# Patient Record
Sex: FEMALE | Race: WHITE | NOT HISPANIC OR LATINO | Employment: FULL TIME | URBAN - METROPOLITAN AREA
[De-identification: names, ages, dates, MRNs, and addresses within clinical notes are randomized per-mention and may not be internally consistent; named-entity substitution may affect disease eponyms.]

---

## 2023-02-04 ENCOUNTER — OFFICE VISIT (OUTPATIENT)
Dept: URGENT CARE | Facility: CLINIC | Age: 28
End: 2023-02-04
Payer: COMMERCIAL

## 2023-02-04 VITALS
HEIGHT: 65 IN | SYSTOLIC BLOOD PRESSURE: 125 MMHG | DIASTOLIC BLOOD PRESSURE: 85 MMHG | OXYGEN SATURATION: 98 % | HEART RATE: 88 BPM | RESPIRATION RATE: 18 BRPM | TEMPERATURE: 99 F | WEIGHT: 165 LBS | BODY MASS INDEX: 27.49 KG/M2

## 2023-02-04 DIAGNOSIS — M25.531 PAIN AND SWELLING OF RIGHT WRIST: ICD-10-CM

## 2023-02-04 DIAGNOSIS — M25.431 PAIN AND SWELLING OF RIGHT WRIST: ICD-10-CM

## 2023-02-04 DIAGNOSIS — S52.591A OTHER CLOSED FRACTURE OF DISTAL END OF RIGHT RADIUS, INITIAL ENCOUNTER: Primary | ICD-10-CM

## 2023-02-04 PROCEDURE — 3074F SYST BP LT 130 MM HG: CPT | Mod: CPTII,,, | Performed by: FAMILY MEDICINE

## 2023-02-04 PROCEDURE — 1159F PR MEDICATION LIST DOCUMENTED IN MEDICAL RECORD: ICD-10-PCS | Mod: CPTII,,, | Performed by: FAMILY MEDICINE

## 2023-02-04 PROCEDURE — 1160F RVW MEDS BY RX/DR IN RCRD: CPT | Mod: CPTII,,, | Performed by: FAMILY MEDICINE

## 2023-02-04 PROCEDURE — 3079F DIAST BP 80-89 MM HG: CPT | Mod: CPTII,,, | Performed by: FAMILY MEDICINE

## 2023-02-04 PROCEDURE — 99203 PR OFFICE/OUTPT VISIT, NEW, LEVL III, 30-44 MIN: ICD-10-PCS | Mod: ,,, | Performed by: FAMILY MEDICINE

## 2023-02-04 PROCEDURE — 3008F BODY MASS INDEX DOCD: CPT | Mod: CPTII,,, | Performed by: FAMILY MEDICINE

## 2023-02-04 PROCEDURE — 3079F PR MOST RECENT DIASTOLIC BLOOD PRESSURE 80-89 MM HG: ICD-10-PCS | Mod: CPTII,,, | Performed by: FAMILY MEDICINE

## 2023-02-04 PROCEDURE — 3074F PR MOST RECENT SYSTOLIC BLOOD PRESSURE < 130 MM HG: ICD-10-PCS | Mod: CPTII,,, | Performed by: FAMILY MEDICINE

## 2023-02-04 PROCEDURE — 3008F PR BODY MASS INDEX (BMI) DOCUMENTED: ICD-10-PCS | Mod: CPTII,,, | Performed by: FAMILY MEDICINE

## 2023-02-04 PROCEDURE — 99203 OFFICE O/P NEW LOW 30 MIN: CPT | Mod: ,,, | Performed by: FAMILY MEDICINE

## 2023-02-04 PROCEDURE — 1159F MED LIST DOCD IN RCRD: CPT | Mod: CPTII,,, | Performed by: FAMILY MEDICINE

## 2023-02-04 PROCEDURE — 1160F PR REVIEW ALL MEDS BY PRESCRIBER/CLIN PHARMACIST DOCUMENTED: ICD-10-PCS | Mod: CPTII,,, | Performed by: FAMILY MEDICINE

## 2023-02-04 RX ORDER — NORETHINDRONE ACETATE AND ETHINYL ESTRADIOL, AND FERROUS FUMARATE 1MG-20(24)
1 KIT ORAL DAILY
COMMUNITY
Start: 2023-01-16

## 2023-02-04 RX ORDER — DEXTROAMPHETAMINE SACCHARATE, AMPHETAMINE ASPARTATE, DEXTROAMPHETAMINE SULFATE AND AMPHETAMINE SULFATE 7.5; 7.5; 7.5; 7.5 MG/1; MG/1; MG/1; MG/1
TABLET ORAL
COMMUNITY

## 2023-02-04 NOTE — LETTER
"    [facility] FAX TRANSMISSION  Fax Cover Sheet        DATE: 02/04/2023    TO: [facility]    FAX: [phone]    ATTN: Scheduling    FROM: [facility]                Phone: [phone]                Fax: [phone]    BY:  Anil    COMMENTS:  **Urgent Referral- Concern for Buckle Fx Wrist**                        CONFIDENTIALITY NOTICE This facsimile transmission and any documents accompanying are intended only for the use of the individual or entity names above on this transmission sheet and may contain information from [facility], which is confidential, privileged, and exempt from disclosure under applicable law. If you are not the intended recipient, you are notified that any disclosure, copying, distributing, or use of the contents of this facsimile is strictly prohibited. If you have received this transmission in error, please notify us.               Office Visit  02/04/2023  Summary of Care      Alysha Capps "Alysha" - 28 y.o. Female; born Jan. 26, 1995  Summary of Care, generated on Feb. 04, 2023  Reason for Referral    Consultation (Urgent) - Authorized  Reason for Referral - Consultation (Urgent) - Authorized  Specialty Diagnoses / Procedures Referred By Contact Referred To Contact   Orthopedic Surgery / Orthopedics Diagnoses    Other closed fracture of distal end of right radius, initial encounter     Debora Nesbitt MD    [address] [place] [address]    Phone: [phone]    Fax: [phone]   Michael Gray MD    [facility]    [address]    [place] [address]    Phone: [phone]    Fax: [phone]       Reason for Referral - Consultation (Urgent) - Authorized  Referral ID Status Reason Start Date Expiration Date Visits Requested Visits Authorized   [id number] Authorized   2/4/2023 2/4/2024 1 1      Electronically signed by Debora Nesbitt MD at 02/04/2023 10:27 AM CST  "   Reason for Visit    Reason for Visit -   Reason Comments   Wrist Pain Patient was roller skating and fell yesterday on her right wrist      Encounter Details    Encounter Details  Date Type Department Care Team Description   02/04/2023 Office Visit [facility]    [address]    [place] [id number] [phone]   Debora Nesbitt MD    [address]    [place] [address]    [phone] (Work)    [phone] (Fax)   Other closed fracture of distal end of right radius, initial encounter (Primary Dx);   Pain and swelling of right wrist     Allergies  - documented as of this encounter (statuses as of 02/04/2023)  No known active allergies  Medications  - documented as of this encounter (statuses as of 02/04/2023)  Medications  Medication Sig Dispensed Refills Start Date End Date Status   dextroamphetamine-amphetamine 30 mg Tab   Adderall Take No date recorded No form recorded No frequency recorded No route recorded No set duration recorded No set duration amount recorded active No dosage strength recorded No dosage strength units of measure recorded   0     Active   CHAPARRITA 24 FE 1 mg-20 mcg (24)/75 mg (4) per tablet   Take 1 tablet by mouth.   0 01/16/2023   Active     Active Problems  - documented as of this encounter (statuses as of 02/04/2023)  No known active problems    Social History  - documented as of this encounter  Social History  Tobacco Use Types Packs/Day Years Used Date   Smoking Tobacco: Never           Smokeless Tobacco: Never             Social History  Tobacco Cessation: Counseling Given: Not Answered     Social History  Alcohol Use Standard Drinks/Week Comments   Yes 0 (1 standard drink = 0.6 oz pure alcohol)       Social History  Sex Assigned at Birth Date Recorded   Not on file       Last Filed Vital Signs  - documented in this encounter  Last Filed Vital Signs  Vital Sign Reading Time Taken Comments   Blood Pressure 125/85 02/04/2023 10:02  "AM CST     Pulse 88 02/04/2023 10:02 AM CST     Temperature 37.1 °C (98.7 °F) 02/04/2023 10:02 AM CST     Respiratory Rate 18 02/04/2023 10:02 AM CST     Oxygen Saturation 98% 02/04/2023 10:02 AM CST     Inhaled Oxygen Concentration - -     Weight 74.8 kg (165 lb) 02/04/2023 10:02 AM CST     Height 165.1 cm (5' 5") 02/04/2023 10:02 AM CST     Body Mass Index 27.46 02/04/2023 10:02 AM CST       Patient Instructions  - documented in this encounter  Patient Instructions  Debora Nesbitt MD - 02/04/2023 9:55 AM CST    Formatting of this note might be different from the original.  With history of fall, right wrist pain and swelling x-rays today.  Reviewed the x-rays, concerns of buckle fracture distal radial head, nondisplaced. Radiology final results will be monitored and reported  Rest, ice, splint for now, elevation. Ibuprofen for pain every 8 hours.  ER precautions with any acute change in symptoms like tingling numbness and weakness  . Orthopedic follow-up. Referral in the chart  Electronically signed by Debora Nesbitt MD at 02/04/2023 10:27 AM CST     Progress Notes  - documented in this encounter  Debora Nesbitt MD - 02/04/2023 9:55 AM CST  Formatting of this note is different from the original.  Subjective:     Patient ID: Alysha Capps is a 28 y.o. female.    Vitals: height is 5' 5" (1.651 m) and weight is 74.8 kg (165 lb). Her temperature is 98.7 °F (37.1 °C). Her blood pressure is 125/85 and her pulse is 88. Her respiration is 18 and oxygen saturation is 98%.     Chief Complaint: Wrist Pain (Patient was roller skating and fell yesterday on her right wrist )    HPI: 28-year-old female present to clinic with concerns of right wrist pain. History of fall yesterday was skateboarding. Denies tingling numbness or weakness to hands or fingers. No head injury, no loss of consciousness. States was wearing the wrist brace.    ROS :  Constitutional : No fever, no fatigue  Neck : Negative except HPI  Respiratory : No " shortness of breath, no wheezing  Cardiovascular : No chest pain  Musculoskeletal : Negative except HPI  Integumentary : No rash, no abnormal lesion  Neurological : Negative for tingling numbness and weakness   Objective:     Physical Exam  General : Alert and oriented, No apparent distress, Afebrile  Neck -: supple, Non Tender  Respiratory :Lungs are clear to auscultate, Breath sounds are equal  Cardiovascular : Normal rate, normal volume pulse bilateral  Musculoskeletal :_ right wrist dorsum radially appears swollen and bruised, tender to palpate. Fingers range of motion limited due to pain  Integumentary : Warm, Dry   Neurologic : Alert and Oriented X 4, sensations intact, motor intact   Assessment:     1. Other closed fracture of distal end of right radius, initial encounter   2. Pain and swelling of right wrist       Plan:   With history of fall, right wrist pain and swelling x-rays today.  Reviewed the x-rays, concerns of buckle fracture distal radial head, nondisplaced. Radiology final results will be monitored and reported  Rest, ice, splint for now, elevation. Ibuprofen for pain every 8 hours.  ER precautions with any acute change in symptoms like tingling numbness and weakness  . Orthopedic follow-up. Referral in the chart    Other closed fracture of distal end of right radius, initial encounter  - Ambulatory referral/consult to Orthopedics    Pain and swelling of right wrist  - XR WRIST COMPLETE 3 VIEWS RIGHT; Future; Expected date: 02/04/2023  Electronically signed by Debora Nesbitt MD at 02/04/2023 10:27 AM CST   Miscellaneous Notes  - documented in this encounter  Addendum Note - RT Israel - 02/04/2023 9:55 AM CST  Addended by: SHAYE GOLDEN on: 2/4/2023 10:31 AM    Modules accepted: Orders      Electronically signed by RT Israel at 02/04/2023 10:31 AM CST   Plan of Treatment  - documented as of this encounter  Plan of Treatment - Scheduled Referrals  Scheduled Referrals  Name Type  Priority Associated Diagnoses Order Schedule   Ambulatory referral/consult to Orthopedics Outpatient Referral Urgent Other closed fracture of distal end of right radius, initial encounter   Ordered: 02/04/2023     Plan of Treatment  Health Maintenance Due Date Last Done Comments   TETANUS VACCINE 01/26/2014       Pap Smear 01/26/2017       COVID-19 Vaccine (2 - Pfizer series) 09/09/2021 08/19/2021     HIV Screening Completed 07/12/2021, 07/12/2021     Hepatitis C Screening Completed 07/12/2021     Lipid Panel Completed 08/15/2022     Influenza Vaccine Completed 11/03/2022, 10/18/2021, 10/15/2017, Additional history exists     Pneumococcal Vaccines (Age 0-64) Aged Out   No longer eligible based on patient's age to complete this topic     Results  - documented in this encounter  XR WRIST COMPLETE 3 VIEWS RIGHT (02/04/2023 10:17 AM CST)  Results - XR WRIST COMPLETE 3 VIEWS RIGHT (02/04/2023 10:17 AM CST)  Anatomical Region Laterality Modality   Wrist Right Digital Radiography     Results - XR WRIST COMPLETE 3 VIEWS RIGHT (02/04/2023 10:17 AM CST)  Specimen (Source) Anatomical Location / Laterality Collection Method / Volume Collection Time Received Time                 Results - XR WRIST COMPLETE 3 VIEWS RIGHT (02/04/2023 10:17 AM CST)  Impressions   02/04/2023 10:28 AM CST      No acute findings.      Electronically signed by: Isaias Crum  Date: 02/04/2023  Time: 10:28       Results - XR WRIST COMPLETE 3 VIEWS RIGHT (02/04/2023 10:17 AM CST)  Narrative   02/04/2023 10:28 AM CST    EXAMINATION:  XR WRIST COMPLETE 3 VIEWS RIGHT    CLINICAL HISTORY:  Fell yesterday while skateboarding;  Pain in right wrist    COMPARISON:  None    FINDINGS:  Three views of the right wrist demonstrate no fracture or dislocation.         Results - XR WRIST COMPLETE 3 VIEWS RIGHT (02/04/2023 10:17 AM CST)  Procedure Note   Isaias Crum MD - 02/04/2023    Formatting of this note might be different from the original.  EXAMINATION:  XR  WRIST COMPLETE 3 VIEWS RIGHT    CLINICAL HISTORY:  Fell yesterday while skateboarding; Pain in right wrist    COMPARISON:  None    FINDINGS:  Three views of the right wrist demonstrate no fracture or dislocation.    Impression:    No acute findings.      Electronically signed by:Isaias Crum  Date:02/04/2023  Time:10:28     Results - XR WRIST COMPLETE 3 VIEWS RIGHT (02/04/2023 10:17 AM CST)  Authorizing Provider Result Type   Debora Nesbitt MD IMG DIAGNOSTIC IMAGING ORDERABLES     Visit Diagnoses  - documented in this encounter  Visit Diagnoses  Diagnosis   Other closed fracture of distal end of right radius, initial encounter - Primary     Pain and swelling of right wrist     Pain and swelling of right wrist       Insurance  - documented as of this encounter  Insurance  Payer Benefit Plan / Group Subscriber ID Effective Dates Phone Address Type   [employer]EFIT [facility] [employer] [place] [id number] 1/1/2023-Present   [address] [address]    [place] [phone]   Self Funded     Insurance  Guarantor Name Account Type Relation to Patient Date of Birth Phone Billing Address   Alysha Capps Personal/Family Self 1995 [phone] (Home)   [address] [place] [id number]       Images  Patient Demographics    Patient Demographics  Patient Address Communication Language Race / Ethnicity Marital Status   [address] [place] [address] [phone] (Home)  [phone] (Mobile)  [email or web address] English (Preferred) White / Not  or  Single     Patient Contacts    Patient Contacts  Contact Name Contact Address Communication Relationship to Patient   Billy Ndiaye Unknown [phone] (Mobile) Significant other, Emergency Contact     Document Information    Primary Care Provider Other Service Providers Document Coverage Dates   Renny Mccartney Jr. (Feb. 04, 2023February 04, 2023 - Present)  [phone] (Work)  [phone] (Fax)  [address] [place]  [address]  Internal Medicine  [facility]  [address] [address]  [place] [address]  Feb. 04, 2023February 04, 2023     Encounter Providers Encounter Date   Debora Nesbitt MD (Attending)  [phone] (Work)  [phone] (Fax)  [address] [place] [address]  Family Medicine Feb. 04, 2023February 04, 2023

## 2023-02-04 NOTE — PATIENT INSTRUCTIONS
With history of fall, right wrist pain and swelling x-rays today.  Reviewed the x-rays, concerns of buckle fracture distal radial head, nondisplaced.  Radiology final results will be monitored and reported  Rest, ice, splint for now, elevation.  Ibuprofen for pain every 8 hours.  ER precautions with any acute change in symptoms like tingling numbness and weakness  .  Orthopedic follow-up.  Referral in the chart

## 2023-02-04 NOTE — PROGRESS NOTES
"Subjective:       Patient ID: Alysha Capps is a 28 y.o. female.    Vitals:  height is 5' 5" (1.651 m) and weight is 74.8 kg (165 lb). Her temperature is 98.7 °F (37.1 °C). Her blood pressure is 125/85 and her pulse is 88. Her respiration is 18 and oxygen saturation is 98%.     Chief Complaint: Wrist Pain (Patient was roller skating and fell yesterday on her right wrist )    HPI:  28-year-old female present to clinic with concerns of right wrist pain.  History of fall yesterday was skateboarding.  Denies tingling numbness or weakness to hands or fingers.  No head injury, no loss of consciousness.  States was wearing the wrist brace.    ROS  :  Constitutional : No fever, no fatigue  Neck : Negative except HPI  Respiratory : No shortness of breath, no wheezing  Cardiovascular : No chest pain  Musculoskeletal : Negative except HPI  Integumentary : No rash, no abnormal lesion  Neurological : Negative for tingling numbness and weakness   Objective:      Physical Exam    General : Alert and oriented, No apparent distress, Afebrile  Neck -: supple, Non Tender  Respiratory :Lungs are clear to auscultate, Breath sounds are equal  Cardiovascular : Normal rate, normal volume pulse bilateral  Musculoskeletal :_ right wrist dorsum radially appears swollen and bruised, tender to palpate.  Fingers range of motion limited due to pain  Integumentary : Warm, Dry   Neurologic : Alert and Oriented X 4, sensations intact, motor intact   Assessment:       1. Other closed fracture of distal end of right radius, initial encounter    2. Pain and swelling of right wrist          Plan:     With history of fall, right wrist pain and swelling x-rays today.  Reviewed the x-rays, concerns of buckle fracture distal radial head, nondisplaced.  Radiology final results will be monitored and reported  Rest, ice, splint for now, elevation.  Ibuprofen for pain every 8 hours.  ER precautions with any acute change in symptoms like tingling numbness and " weakness  .  Orthopedic follow-up.  Referral in the chart    Other closed fracture of distal end of right radius, initial encounter  -     Ambulatory referral/consult to Orthopedics    Pain and swelling of right wrist  -     XR WRIST COMPLETE 3 VIEWS RIGHT; Future; Expected date: 02/04/2023

## 2023-02-04 NOTE — LETTER
"    [facility] FAX TRANSMISSION  Fax Cover Sheet        DATE: 02/04/2023    TO: [facility]    FAX: [phone]    ATTN: Scheduling    FROM: [facility]                Phone: [phone]                Fax: [phone]    BY:  Anil    COMMENTS:  ****Urgent Referral- Concern for Buckle Fx Wrist****                        CONFIDENTIALITY NOTICE This facsimile transmission and any documents accompanying are intended only for the use of the individual or entity names above on this transmission sheet and may contain information from [facility], which is confidential, privileged, and exempt from disclosure under applicable law. If you are not the intended recipient, you are notified that any disclosure, copying, distributing, or use of the contents of this facsimile is strictly prohibited. If you have received this transmission in error, please notify us.         Office Visit  02/04/2023  Summary of Care      Alysha Capps "Alysha" - 28 y.o. Female; born [birth year]Summary of Care, generated on Feb. 04, 2023February 04, 2023   Reason for Referral    Consultation (Urgent) - Authorized  Reason for Referral - Consultation (Urgent) - Authorized  Specialty Diagnoses / Procedures Referred By Contact Referred To Contact   Orthopedic Surgery / Orthopedics Diagnoses    Other closed fracture of distal end of right radius, initial encounter     Debora Nesbitt MD    [facility] [place] [address]    Phone: [phone]    Fax: [phone]   Michael Gray MD    [facility] [facility]    [address]    [place] [address]    Phone: [phone]    Fax: [phone]       Reason for Referral - Consultation (Urgent) - Authorized  Referral ID Status Reason Start Date Expiration Date Visits Requested Visits Authorized   [id number] Authorized   2/4/2023 2/4/2024 1 1      Electronically signed by Debora Nesbitt MD at " 02/04/2023 10:27 AM CST    Reason for Visit    Reason for Visit -   Reason Comments   Wrist Pain Patient was roller skating and fell yesterday on her right wrist      Encounter Details    Encounter Details  Date Type Department Care Team Description   02/04/2023 Office Visit [facility]    [address]    [place] [id number] [phone]   Debora Nesbitt MD    [address] [address]    [phone] (Work)    [phone] (Fax)   Other closed fracture of distal end of right radius, initial encounter (Primary Dx);   Pain and swelling of right wrist     Allergies  - documented as of this encounter (statuses as of 02/04/2023)  No known active allergies  Medications  - documented as of this encounter (statuses as of 02/04/2023)  Medications  Medication Sig Dispensed Refills Start Date End Date Status   dextroamphetamine-amphetamine 30 mg Tab   Adderall Take No date recorded No form recorded No frequency recorded No route recorded No set duration recorded No set duration amount recorded active No dosage strength recorded No dosage strength units of measure recorded   0     Active   CHAPARRITA 24 FE 1 mg-20 mcg (24)/75 mg (4) per tablet   Take 1 tablet by mouth.   0 01/16/2023   Active     Active Problems  - documented as of this encounter (statuses as of 02/04/2023)  No known active problems    Social History  - documented as of this encounter  Social History  Tobacco Use Types Packs/Day Years Used Date   Smoking Tobacco: Never           Smokeless Tobacco: Never             Social History  Tobacco Cessation: Counseling Given: Not Answered     Social History  Alcohol Use Standard Drinks/Week Comments   Yes 0 (1 standard drink = 0.6 oz pure alcohol)       Social History  Sex Assigned at Birth Date Recorded   Not on file       Last Filed Vital Signs  - documented in this encounter  Last Filed Vital Signs  Vital Sign Reading Time Taken Comments   Blood  "Pressure 125/85 02/04/2023 10:02 AM CST     Pulse 88 02/04/2023 10:02 AM CST     Temperature 37.1 °C (98.7 °F) 02/04/2023 10:02 AM CST     Respiratory Rate 18 02/04/2023 10:02 AM CST     Oxygen Saturation 98% 02/04/2023 10:02 AM CST     Inhaled Oxygen Concentration - -     Weight 74.8 kg (165 lb) 02/04/2023 10:02 AM CST     Height 165.1 cm (5' 5") 02/04/2023 10:02 AM CST     Body Mass Index 27.46 02/04/2023 10:02 AM CST       Patient Instructions  - documented in this encounter  Patient Instructions  Debora Nesbitt MD - 02/04/2023 9:55 AM CST    Formatting of this note might be different from the original.  With history of fall, right wrist pain and swelling x-rays today.  Reviewed the x-rays, concerns of buckle fracture distal radial head, nondisplaced. Radiology final results will be monitored and reported  Rest, ice, splint for now, elevation. Ibuprofen for pain every 8 hours.  ER precautions with any acute change in symptoms like tingling numbness and weakness  . Orthopedic follow-up. Referral in the chart  Electronically signed by Debora Nesbitt MD at 02/04/2023 10:27 AM CST     Progress Notes  - documented in this encounter  Debora Nesbitt MD - 02/04/2023 9:55 AM CST  Formatting of this note is different from the original.  Subjective:     Patient ID: Alysha Capps is a 28 y.o. female.    Vitals: height is 5' 5" (1.651 m) and weight is 74.8 kg (165 lb). Her temperature is 98.7 °F (37.1 °C). Her blood pressure is 125/85 and her pulse is 88. Her respiration is 18 and oxygen saturation is 98%.     Chief Complaint: Wrist Pain (Patient was roller skating and fell yesterday on her right wrist )    HPI: 28-year-old female present to clinic with concerns of right wrist pain. History of fall yesterday was skateboarding. Denies tingling numbness or weakness to hands or fingers. No head injury, no loss of consciousness. States was wearing the wrist brace.    ROS :  Constitutional : No fever, no fatigue  Neck : Negative " except HPI  Respiratory : No shortness of breath, no wheezing  Cardiovascular : No chest pain  Musculoskeletal : Negative except HPI  Integumentary : No rash, no abnormal lesion  Neurological : Negative for tingling numbness and weakness   Objective:     Physical Exam  General : Alert and oriented, No apparent distress, Afebrile  Neck -: supple, Non Tender  Respiratory :Lungs are clear to auscultate, Breath sounds are equal  Cardiovascular : Normal rate, normal volume pulse bilateral  Musculoskeletal :_ right wrist dorsum radially appears swollen and bruised, tender to palpate. Fingers range of motion limited due to pain  Integumentary : Warm, Dry   Neurologic : Alert and Oriented X 4, sensations intact, motor intact   Assessment:     1. Other closed fracture of distal end of right radius, initial encounter   2. Pain and swelling of right wrist       Plan:   With history of fall, right wrist pain and swelling x-rays today.  Reviewed the x-rays, concerns of buckle fracture distal radial head, nondisplaced. Radiology final results will be monitored and reported  Rest, ice, splint for now, elevation. Ibuprofen for pain every 8 hours.  ER precautions with any acute change in symptoms like tingling numbness and weakness  . Orthopedic follow-up. Referral in the chart    Other closed fracture of distal end of right radius, initial encounter  - Ambulatory referral/consult to Orthopedics    Pain and swelling of right wrist  - XR WRIST COMPLETE 3 VIEWS RIGHT; Future; Expected date: 02/04/2023                Electronically signed by Debora Nesbitt MD at 02/04/2023 10:27 AM CST   Miscellaneous Notes  - documented in this encounter  Addendum Note - RT Israel - 02/04/2023 9:55 AM CST  Addended by: SHAYE GOLDEN on: 2/4/2023 10:31 AM    Modules accepted: Orders      Electronically signed by RT Israel at 02/04/2023 10:31 AM CST   Plan of Treatment  - documented as of this encounter  Plan of Treatment - Scheduled  Referrals  Scheduled Referrals  Name Type Priority Associated Diagnoses Order Schedule   Ambulatory referral/consult to Orthopedics Outpatient Referral Urgent Other closed fracture of distal end of right radius, initial encounter   Ordered: 02/04/2023     Plan of Treatment  Health Maintenance Due Date Last Done Comments   TETANUS VACCINE 01/26/2014       Pap Smear 01/26/2017       COVID-19 Vaccine (2 - Pfizer series) 09/09/2021 08/19/2021     HIV Screening Completed 07/12/2021, 07/12/2021     Hepatitis C Screening Completed 07/12/2021     Lipid Panel Completed 08/15/2022     Influenza Vaccine Completed 11/03/2022, 10/18/2021, 10/15/2017, Additional history exists     Pneumococcal Vaccines (Age 0-64) Aged Out   No longer eligible based on patient's age to complete this topic     Results  - documented in this encounter  XR WRIST COMPLETE 3 VIEWS RIGHT (02/04/2023 10:17 AM CST)  Results - XR WRIST COMPLETE 3 VIEWS RIGHT (02/04/2023 10:17 AM CST)  Anatomical Region Laterality Modality   Wrist Right Digital Radiography     Results - XR WRIST COMPLETE 3 VIEWS RIGHT (02/04/2023 10:17 AM CST)  Specimen (Source) Anatomical Location / Laterality Collection Method / Volume Collection Time Received Time                 Results - XR WRIST COMPLETE 3 VIEWS RIGHT (02/04/2023 10:17 AM CST)  Impressions   02/04/2023 10:28 AM CST      No acute findings.      Electronically signed by: Isaias Crum  Date: 02/04/2023  Time: 10:28       Results - XR WRIST COMPLETE 3 VIEWS RIGHT (02/04/2023 10:17 AM CST)  Narrative   02/04/2023 10:28 AM CST    EXAMINATION:  XR WRIST COMPLETE 3 VIEWS RIGHT    CLINICAL HISTORY:  Fell yesterday while skateboarding;  Pain in right wrist    COMPARISON:  None    FINDINGS:  Three views of the right wrist demonstrate no fracture or dislocation.         Results - XR WRIST COMPLETE 3 VIEWS RIGHT (02/04/2023 10:17 AM CST)  Procedure Note   Isaias Crum MD - 02/04/2023    Formatting of this note might be  different from the original.  EXAMINATION:  XR WRIST COMPLETE 3 VIEWS RIGHT    CLINICAL HISTORY:  Fell yesterday while skateboarding; Pain in right wrist    COMPARISON:  None    FINDINGS:  Three views of the right wrist demonstrate no fracture or dislocation.    Impression:    No acute findings.      Electronically signed by:Isaias Crum  Date:02/04/2023  Time:10:28     Results - XR WRIST COMPLETE 3 VIEWS RIGHT (02/04/2023 10:17 AM CST)  Authorizing Provider Result Type   Debora Nesbitt MD IMG DIAGNOSTIC IMAGING ORDERABLES     Visit Diagnoses  - documented in this encounter  Visit Diagnoses  Diagnosis   Other closed fracture of distal end of right radius, initial encounter - Primary     Pain and swelling of right wrist     Pain and swelling of right wrist       Insurance  - documented as of this encounter  Insurance  Payer Benefit Plan / Group Subscriber ID Effective Dates Phone Address Type   [employer] B[employer]EF[employer] [place] [id number] 1/1/2023-Present   [address] [address]    [place] [address]   Self Funded     Insurance  Guarantor Name Account Type Relation to Patient Date of Birth Phone Billing Address   Alysha Capps Personal/Family Self 1995 [phone] (Home)   [address] [place] [address]       Images  Patient Demographics    Patient Demographics  Patient Address Communication Language Race / Ethnicity Marital Status   [address] [place] [address] [phone] (Home)  [phone] (Mobile)  [email or web address] English (Preferred) White / Not  or  Single     Patient Contacts    Patient Contacts  Contact Name Contact Address Communication Relationship to Patient   Billy Ndiaye Unknown [phone] (Mobile) Significant other, Emergency Contact     Document Information    Primary Care Provider Other Service Providers Document Coverage Dates   Renny Mccartney Jr. (Feb. 04, 2023February 04, 2023 - Present)  [phone]  (Work)  [phone] (Fax)  [address]   [place] [address]  Internal Medicine  [facility]  [address] [address]  [place] [address]  Feb. 04, 2023February 04, 2023     Encounter Providers Encounter Date   Debora Nesbitt MD (Attending)  [phone] (Work)  [phone] (Fax)  [address] [place] [address]  Family Medicine Feb. 04, 2023February 04, 2023

## 2023-02-04 NOTE — LETTER
"    [facility] FAX TRANSMISSION  Fax Cover Sheet        DATE: 02/04/2023    TO: [facility]    FAX: [phone]    ATTN: Scheduling    FROM: [facility]                Phone: [phone]                Fax: [phone]    BY:  Anil    COMMENTS:  ****Urgent Referral- Concern for Buckle Fx Wrist****                        CONFIDENTIALITY NOTICE This facsimile transmission and any documents accompanying are intended only for the use of the individual or entity names above on this transmission sheet and may contain information from [facility], which is confidential, privileged, and exempt from disclosure under applicable law. If you are not the intended recipient, you are notified that any disclosure, copying, distributing, or use of the contents of this facsimile is strictly prohibited. If you have received this transmission in error, please notify us.         Office Visit  02/04/2023  Summary of Care      Alysha Capps "Alysha" - 28 y.o. Female; born [birth year]Summary of Care, generated on Feb. 04, 2023February 04, 2023   Reason for Referral    Consultation (Urgent) - Authorized  Reason for Referral - Consultation (Urgent) - Authorized  Specialty Diagnoses / Procedures Referred By Contact Referred To Contact   Orthopedic Surgery / Orthopedics Diagnoses    Other closed fracture of distal end of right radius, initial encounter     Debora Nesbitt MD    [facility] [place] [address]    Phone: [phone]    Fax: [phone]   Michael Gray MD    [facility]    [address]    [place] [address]    Phone: [phone]    Fax: [phone]       Reason for Referral - Consultation (Urgent) - Authorized  Referral ID Status Reason Start Date Expiration Date Visits Requested Visits Authorized   [id number] Authorized   2/4/2023 2/4/2024 1 1      Electronically signed by Debora Nesbitt MD at " 02/04/2023 10:27 AM CST    Reason for Visit    Reason for Visit -   Reason Comments   Wrist Pain Patient was roller skating and fell yesterday on her right wrist      Encounter Details    Encounter Details  Date Type Department Care Team Description   02/04/2023 Office Visit [facility]    [address]    [place] [id number] [phone]   Debora Nesbitt MD    [address] [phone]    [phone] (Work)    [phone] (Fax)   Other closed fracture of distal end of right radius, initial encounter (Primary Dx);   Pain and swelling of right wrist     Allergies  - documented as of this encounter (statuses as of 02/04/2023)  No known active allergies  Medications  - documented as of this encounter (statuses as of 02/04/2023)  Medications  Medication Sig Dispensed Refills Start Date End Date Status   dextroamphetamine-amphetamine 30 mg Tab   Adderall Take No date recorded No form recorded No frequency recorded No route recorded No set duration recorded No set duration amount recorded active No dosage strength recorded No dosage strength units of measure recorded   0     Active   CHAPARRITA 24 FE 1 mg-20 mcg (24)/75 mg (4) per tablet   Take 1 tablet by mouth.   0 01/16/2023   Active     Active Problems  - documented as of this encounter (statuses as of 02/04/2023)  No known active problems    Social History  - documented as of this encounter  Social History  Tobacco Use Types Packs/Day Years Used Date   Smoking Tobacco: Never           Smokeless Tobacco: Never             Social History  Tobacco Cessation: Counseling Given: Not Answered     Social History  Alcohol Use Standard Drinks/Week Comments   Yes 0 (1 standard drink = 0.6 oz pure alcohol)       Social History  Sex Assigned at Birth Date Recorded   Not on file       Last Filed Vital Signs  - documented in this encounter  Last Filed Vital Signs  Vital Sign Reading Time Taken Comments   Blood  "Pressure 125/85 02/04/2023 10:02 AM CST     Pulse 88 02/04/2023 10:02 AM CST     Temperature 37.1 °C (98.7 °F) 02/04/2023 10:02 AM CST     Respiratory Rate 18 02/04/2023 10:02 AM CST     Oxygen Saturation 98% 02/04/2023 10:02 AM CST     Inhaled Oxygen Concentration - -     Weight 74.8 kg (165 lb) 02/04/2023 10:02 AM CST     Height 165.1 cm (5' 5") 02/04/2023 10:02 AM CST     Body Mass Index 27.46 02/04/2023 10:02 AM CST       Patient Instructions  - documented in this encounter  Patient Instructions  Debora Nesbitt MD - 02/04/2023 9:55 AM CST    Formatting of this note might be different from the original.  With history of fall, right wrist pain and swelling x-rays today.  Reviewed the x-rays, concerns of buckle fracture distal radial head, nondisplaced. Radiology final results will be monitored and reported  Rest, ice, splint for now, elevation. Ibuprofen for pain every 8 hours.  ER precautions with any acute change in symptoms like tingling numbness and weakness  . Orthopedic follow-up. Referral in the chart  Electronically signed by Debora Nesbitt MD at 02/04/2023 10:27 AM CST     Progress Notes  - documented in this encounter  Debora Nesbitt MD - 02/04/2023 9:55 AM CST  Formatting of this note is different from the original.  Subjective:     Patient ID: Alysha Capps is a 28 y.o. female.    Vitals: height is 5' 5" (1.651 m) and weight is 74.8 kg (165 lb). Her temperature is 98.7 °F (37.1 °C). Her blood pressure is 125/85 and her pulse is 88. Her respiration is 18 and oxygen saturation is 98%.     Chief Complaint: Wrist Pain (Patient was roller skating and fell yesterday on her right wrist )    HPI: 28-year-old female present to clinic with concerns of right wrist pain. History of fall yesterday was skateboarding. Denies tingling numbness or weakness to hands or fingers. No head injury, no loss of consciousness. States was wearing the wrist brace.    ROS :  Constitutional : No fever, no fatigue  Neck : Negative " except HPI  Respiratory : No shortness of breath, no wheezing  Cardiovascular : No chest pain  Musculoskeletal : Negative except HPI  Integumentary : No rash, no abnormal lesion  Neurological : Negative for tingling numbness and weakness   Objective:     Physical Exam  General : Alert and oriented, No apparent distress, Afebrile  Neck -: supple, Non Tender  Respiratory :Lungs are clear to auscultate, Breath sounds are equal  Cardiovascular : Normal rate, normal volume pulse bilateral  Musculoskeletal :_ right wrist dorsum radially appears swollen and bruised, tender to palpate. Fingers range of motion limited due to pain  Integumentary : Warm, Dry   Neurologic : Alert and Oriented X 4, sensations intact, motor intact   Assessment:     1. Other closed fracture of distal end of right radius, initial encounter   2. Pain and swelling of right wrist       Plan:   With history of fall, right wrist pain and swelling x-rays today.  Reviewed the x-rays, concerns of buckle fracture distal radial head, nondisplaced. Radiology final results will be monitored and reported  Rest, ice, splint for now, elevation. Ibuprofen for pain every 8 hours.  ER precautions with any acute change in symptoms like tingling numbness and weakness  . Orthopedic follow-up. Referral in the chart    Other closed fracture of distal end of right radius, initial encounter  - Ambulatory referral/consult to Orthopedics    Pain and swelling of right wrist  - XR WRIST COMPLETE 3 VIEWS RIGHT; Future; Expected date: 02/04/2023                Electronically signed by Debora Nesbitt MD at 02/04/2023 10:27 AM CST   Miscellaneous Notes  - documented in this encounter  Addendum Note - RT Israel - 02/04/2023 9:55 AM CST  Addended by: SHAYE GOLDEN on: 2/4/2023 10:31 AM    Modules accepted: Orders      Electronically signed by RT Israel at 02/04/2023 10:31 AM CST   Plan of Treatment  - documented as of this encounter  Plan of Treatment - Scheduled  Referrals  Scheduled Referrals  Name Type Priority Associated Diagnoses Order Schedule   Ambulatory referral/consult to Orthopedics Outpatient Referral Urgent Other closed fracture of distal end of right radius, initial encounter   Ordered: 02/04/2023     Plan of Treatment  Health Maintenance Due Date Last Done Comments   TETANUS VACCINE 01/26/2014       Pap Smear 01/26/2017       COVID-19 Vaccine (2 - Pfizer series) 09/09/2021 08/19/2021     HIV Screening Completed 07/12/2021, 07/12/2021     Hepatitis C Screening Completed 07/12/2021     Lipid Panel Completed 08/15/2022     Influenza Vaccine Completed 11/03/2022, 10/18/2021, 10/15/2017, Additional history exists     Pneumococcal Vaccines (Age 0-64) Aged Out   No longer eligible based on patient's age to complete this topic     Results  - documented in this encounter  XR WRIST COMPLETE 3 VIEWS RIGHT (02/04/2023 10:17 AM CST)  Results - XR WRIST COMPLETE 3 VIEWS RIGHT (02/04/2023 10:17 AM CST)  Anatomical Region Laterality Modality   Wrist Right Digital Radiography     Results - XR WRIST COMPLETE 3 VIEWS RIGHT (02/04/2023 10:17 AM CST)  Specimen (Source) Anatomical Location / Laterality Collection Method / Volume Collection Time Received Time                 Results - XR WRIST COMPLETE 3 VIEWS RIGHT (02/04/2023 10:17 AM CST)  Impressions   02/04/2023 10:28 AM CST      No acute findings.      Electronically signed by: Isaias Crum  Date: 02/04/2023  Time: 10:28       Results - XR WRIST COMPLETE 3 VIEWS RIGHT (02/04/2023 10:17 AM CST)  Narrative   02/04/2023 10:28 AM CST    EXAMINATION:  XR WRIST COMPLETE 3 VIEWS RIGHT    CLINICAL HISTORY:  Fell yesterday while skateboarding;  Pain in right wrist    COMPARISON:  None    FINDINGS:  Three views of the right wrist demonstrate no fracture or dislocation.         Results - XR WRIST COMPLETE 3 VIEWS RIGHT (02/04/2023 10:17 AM CST)  Procedure Note   Isaias Crum MD - 02/04/2023    Formatting of this note might be  different from the original.  EXAMINATION:  XR WRIST COMPLETE 3 VIEWS RIGHT    CLINICAL HISTORY:  Fell yesterday while skateboarding; Pain in right wrist    COMPARISON:  None    FINDINGS:  Three views of the right wrist demonstrate no fracture or dislocation.    Impression:    No acute findings.      Electronically signed by:Isaias Crum  Date:02/04/2023  Time:10:28     Results - XR WRIST COMPLETE 3 VIEWS RIGHT (02/04/2023 10:17 AM CST)  Authorizing Provider Result Type   Debora Nesbitt MD IMG DIAGNOSTIC IMAGING ORDERABLES     Visit Diagnoses  - documented in this encounter  Visit Diagnoses  Diagnosis   Other closed fracture of distal end of right radius, initial encounter - Primary     Pain and swelling of right wrist     Pain and swelling of right wrist       Insurance  - documented as of this encounter  Insurance  Payer Benefit Plan / Group Subscriber ID Effective Dates Phone Address Type   [employer] B[employer] [place] [id number] 1/1/2023-Present   [address] [address]    [place] [address]   Self Funded     Insurance  Guarantor Name Account Type Relation to Patient Date of Birth Phone Billing Address   Alysha Capps Personal/Family Self 1995 [phone] (Home)   [address] [place] [address]       Care Teams  - documented as of this encounter  Care Teams  Team Member Relationship Specialty Start Date End Date   Renny Mccartney Jr.    [address] [place] [address]    [phone] (Work)    [phone] (Fax)   PCP - General Internal Medicine 2/4/23       Images  Patient Demographics    Patient Demographics  Patient Address Communication Language Race / Ethnicity Marital Status   [address] [place] [address] [phone] (Home)  [phone] (Mobile)  [email or web address] English (Preferred) White / Not  or  Single     Patient Contacts    Patient Contacts  Contact Name Contact Address Communication Relationship to Patient    Billy Ndiaye Unknown [phone] (Mobile) Significant other, Emergency Contact     Document Information    Primary Care Provider Other Service Providers Document Coverage Dates   Renny Mccartney Jr. (Feb. 04, 2023February 04, 2023 - Present)  [phone] (Work)  [phone] (Fax)  [address] [place] [address]  Internal Medicine  [facility]  [address] [address]  [place] [address]  Feb. 04, 2023February 04, 2023     Encounter Providers Encounter Date   Debora Nesbitt MD (Attending)  [phone] (Work)  [phone] (Fax)  [address] [place] [address]  Family Medicine Feb. 04, 2023February 04, 2023

## 2023-02-08 ENCOUNTER — OFFICE VISIT (OUTPATIENT)
Dept: ORTHOPEDICS | Facility: CLINIC | Age: 28
End: 2023-02-08
Payer: COMMERCIAL

## 2023-02-08 DIAGNOSIS — S52.501A CLOSED FRACTURE OF DISTAL END OF RIGHT RADIUS, UNSPECIFIED FRACTURE MORPHOLOGY, INITIAL ENCOUNTER: Primary | ICD-10-CM

## 2023-02-08 DIAGNOSIS — M25.531 RIGHT WRIST PAIN: ICD-10-CM

## 2023-02-08 PROCEDURE — 25600 PR CLOSED RX DIST RAD/ULNA FX: ICD-10-PCS | Mod: RT,,, | Performed by: REHABILITATION UNIT

## 2023-02-08 PROCEDURE — 99203 OFFICE O/P NEW LOW 30 MIN: CPT | Mod: 57,,, | Performed by: REHABILITATION UNIT

## 2023-02-08 PROCEDURE — 99203 PR OFFICE/OUTPT VISIT, NEW, LEVL III, 30-44 MIN: ICD-10-PCS | Mod: 57,,, | Performed by: REHABILITATION UNIT

## 2023-02-08 PROCEDURE — 25600 CLTX DST RDL FX/EPHYS SEP WO: CPT | Mod: RT,,, | Performed by: REHABILITATION UNIT

## 2023-02-08 NOTE — LETTER
February 8, 2023    Alysha Capps  [address] [place] [address]          [facility]  [address] [place] [address], [address]  [facility] [address]  Phone: [phone]  Fax: [phone] February 8, 2023     Patient: Alysha Capps   YOB: 1995   Date of Visit: 2/8/2023       To Whom It May Concern:    It is my medical opinion that Alysha Capps may not return to work until her follow up appointment on 02/27/23 for results of imaging to be done.    If you have any questions or concerns, please don't hesitate to call.    Sincerely,        Michael Gray MD

## 2023-02-08 NOTE — PROGRESS NOTES
Subjective:      Patient ID: Alysha Capps is a 28 y.o. female.    Chief Complaint: Injury of the Right Wrist and Follow-up (rt wrist fx post urgent care visit. xrays done on 2/4/23. pain is a 4.)    HPI:   Alysha Capps is a 28 y.o. female who presents today for initial evaluation of her right wrist.  She is right-hand dominant.  She sustained a fall on 2/4/23 while skating.  She went to an urgent care clinic where radiographs were concerning for fracture.  She is placed into a splint and referred for orthopedic care.  She works as a labor and delivery nurse.  No sensory or motor changes distally.  She has swelling and ecchymosis about the wrist.  Tenderness at the distal radius.  No tenderness or pain elsewhere.  No prior injuries.    History reviewed. No pertinent past medical history.  History reviewed. No pertinent surgical history.  Social History     Socioeconomic History    Marital status: Single   Tobacco Use    Smoking status: Never    Smokeless tobacco: Never   Substance and Sexual Activity    Alcohol use: Yes    Drug use: Not Currently    Sexual activity: Yes         Current Outpatient Medications:     dextroamphetamine-amphetamine 30 mg Tab, Adderall Take No date recorded No form recorded No frequency recorded No route recorded No set duration recorded No set duration amount recorded active No dosage strength recorded No dosage strength units of measure recorded, Disp: , Rfl:     CHAPARRITA 24 FE 1 mg-20 mcg (24)/75 mg (4) per tablet, Take 1 tablet by mouth., Disp: , Rfl:   Review of patient's allergies indicates:  No Known Allergies    LMP 01/25/2023     Comprehensive review of systems completed and negative except as per HPI.        Objective:   Head: Normocephalic, without obvious abnormality, atraumatic  Eyes: conjunctivae/corneas clear. EOM's intact  Ears: normal external appearance  Nose: Nares normal. Septum midline. Mucosa normal. No drainage  Throat: normal findings: lips normal without  lesions  Lungs: unlabored breathing on room air  Chest wall: symmetric chest rise  Heart: regular rate and rhythm  Pulses: 2+ and symmetric  Skin: Skin color, texture, turgor normal. No rashes or lesions  Neurologic: Grossly normal    RUE    Appearance:   Mild swelling and minimal ecchymosis about the mortise    Tenderness:   Distal radius none within the snuffbox or distal ulna    AROM:   Reduced flexion and extension of the wrist secondary to pain    Pulses: Palpable radial pulse    Neurological deficits: None    The patient has a warm and well-perfused upper extremity with capillary refill less than 2 seconds. Sensation is intact to light touch in terminal nerve distributions. 4/5 ain/pin/uln. The patient has no palpable epitrochlear lymphadenopathy.    Assessment:     Imaging:   Narrative & Impression  EXAMINATION:  XR WRIST COMPLETE 3 VIEWS RIGHT     CLINICAL HISTORY:  Fell yesterday while skateboarding;  Pain in right wrist     COMPARISON:  None     FINDINGS:  Three views of the right wrist demonstrate no fracture or dislocation.     Impression:     No acute findings.        Electronically signed by: Isaias Crum  Date:                                            02/04/2023  Time:                                           10:28           Exam Ended: 02/04/23 10:17 Last Resulted: 02/04/23 10:28           Images reviewed concerning for small fracture of the distal radius that is nondisplaced and extra-articular.      1. Closed fracture of distal end of right radius, unspecified fracture morphology, initial encounter    2. Right wrist pain          Plan:       Orders Placed This Encounter    CT Wrist Without Contrast Right      Imaging and exam findings discussed with the patient.  She sustained an acute injury to her right wrist.  Radiographs with concern for small nondisplaced fracture of the distal radius.  We will proceed with CT scan to further delineate injury due to her pain and swelling.  She will be  fitted for a removable wrist brace.  She will remain off work pending further workup.  I will see her back after this is completed to discuss results and treatment plan for fracture care.

## 2023-03-06 ENCOUNTER — OFFICE VISIT (OUTPATIENT)
Dept: ORTHOPEDICS | Facility: CLINIC | Age: 28
End: 2023-03-06
Payer: COMMERCIAL

## 2023-03-06 VITALS — WEIGHT: 165 LBS | HEIGHT: 65 IN | BODY MASS INDEX: 27.49 KG/M2

## 2023-03-06 DIAGNOSIS — S52.501D CLOSED FRACTURE OF DISTAL END OF RIGHT RADIUS WITH ROUTINE HEALING, UNSPECIFIED FRACTURE MORPHOLOGY, SUBSEQUENT ENCOUNTER: Primary | ICD-10-CM

## 2023-03-06 PROCEDURE — 3008F PR BODY MASS INDEX (BMI) DOCUMENTED: ICD-10-PCS | Mod: CPTII,,, | Performed by: REHABILITATION UNIT

## 2023-03-06 PROCEDURE — 99024 PR POST-OP FOLLOW-UP VISIT: ICD-10-PCS | Mod: ,,, | Performed by: REHABILITATION UNIT

## 2023-03-06 PROCEDURE — 1159F PR MEDICATION LIST DOCUMENTED IN MEDICAL RECORD: ICD-10-PCS | Mod: CPTII,,, | Performed by: REHABILITATION UNIT

## 2023-03-06 PROCEDURE — 3008F BODY MASS INDEX DOCD: CPT | Mod: CPTII,,, | Performed by: REHABILITATION UNIT

## 2023-03-06 PROCEDURE — 1159F MED LIST DOCD IN RCRD: CPT | Mod: CPTII,,, | Performed by: REHABILITATION UNIT

## 2023-03-06 PROCEDURE — 99024 POSTOP FOLLOW-UP VISIT: CPT | Mod: ,,, | Performed by: REHABILITATION UNIT

## 2023-03-06 NOTE — PROGRESS NOTES
"Subjective:      Patient ID: Alysha Capps is a 28 y.o. female.    Chief Complaint: Results (rt wrist ct results. states it has been getting better today. has been having relief from the brace. )    HPI:   Alysha Capps is a 28 y.o. female who presents today for follow-up evaluation of her right wrist.  She is improving.  Pain is improved.  Motion is also improved.  She has been wearing her brace.  She reports that immobilization has been helpful.  Her swelling is also better.  No sensory or motor changes distally.  She has been off work.    Initial HPI:  She is right-hand dominant.  She sustained a fall on 2/4/23 while skating.  She went to an urgent care clinic where radiographs were concerning for fracture.  She is placed into a splint and referred for orthopedic care.  She works as a labor and delivery nurse.  No sensory or motor changes distally.  She has swelling and ecchymosis about the wrist.  Tenderness at the distal radius.  No tenderness or pain elsewhere.  No prior injuries.    History reviewed. No pertinent past medical history.  History reviewed. No pertinent surgical history.  Social History     Socioeconomic History    Marital status: Single   Tobacco Use    Smoking status: Never    Smokeless tobacco: Never   Substance and Sexual Activity    Alcohol use: Yes    Drug use: Not Currently    Sexual activity: Yes         Current Outpatient Medications:     dextroamphetamine-amphetamine 30 mg Tab, Adderall Take No date recorded No form recorded No frequency recorded No route recorded No set duration recorded No set duration amount recorded active No dosage strength recorded No dosage strength units of measure recorded, Disp: , Rfl:     CHAPARRITA 24 FE 1 mg-20 mcg (24)/75 mg (4) per tablet, Take 1 tablet by mouth., Disp: , Rfl:   Review of patient's allergies indicates:  No Known Allergies    Ht 5' 5" (1.651 m)   Wt 74.8 kg (165 lb)   LMP 01/25/2023   BMI 27.46 kg/m²     Comprehensive review of systems " completed and negative except as per HPI.        Objective:   Head: Normocephalic, without obvious abnormality, atraumatic  Eyes: conjunctivae/corneas clear. EOM's intact  Ears: normal external appearance  Nose: Nares normal. Septum midline. Mucosa normal. No drainage  Throat: normal findings: lips normal without lesions  Lungs: unlabored breathing on room air  Chest wall: symmetric chest rise  Heart: regular rate and rhythm  Pulses: 2+ and symmetric  Skin: Skin color, texture, turgor normal. No rashes or lesions  Neurologic: Grossly normal    RUE    Appearance:   No significant swelling    Tenderness:   Minimal at the Distal radius none within the snuffbox or distal ulna    AROM:   Motion is full and symmetric    Pulses: Palpable radial pulse    Neurological deficits: None    The patient has a warm and well-perfused upper extremity with capillary refill less than 2 seconds. Sensation is intact to light touch in terminal nerve distributions. 4/5 ain/pin/uln. The patient has no palpable epitrochlear lymphadenopathy.    Assessment:     Imaging:   Narrative & Impression  EXAMINATION:  XR WRIST COMPLETE 3 VIEWS RIGHT     CLINICAL HISTORY:  Fell yesterday while skateboarding;  Pain in right wrist     COMPARISON:  None     FINDINGS:  Three views of the right wrist demonstrate no fracture or dislocation.     Impression:     No acute findings.        Electronically signed by: Isaias Crum  Date:                                            02/04/2023  Time:                                           10:28           Exam Ended: 02/04/23 10:17 Last Resulted: 02/04/23 10:28             CT Wrist Without Contrast Right  Narrative: EXAMINATION:  CT WRIST WITHOUT CONTRAST RIGHT    CLINICAL HISTORY:  Wrist pain, persistent, neg xray;  Pain in unspecified wrist    TECHNIQUE:  Helical noncontrast acquisition of the right wrist is performed, reformatted in the axial, sagittal, and coronal planes    COMPARISON:  Radiographs February 4,  2023    FINDINGS:  A subtle acute appearing nondisplaced fracture is present in the metadiaphyseal region of the distal radius dorsal cortex.  No intra-articular extension is appreciated.  There is no ulnar fracture or carpal fracture.  The scapholunate interval does not appear widened.  The scaphoid waist is intact.  Limited CT evaluation of the soft tissues shows no gross pathology.  Impression: Right wrist acute nondisplaced fracture involving the metadiaphyseal region of the distal radius dorsal cortex.    Electronically signed by: Hilton Pradhan  Date:    02/09/2023  Time:    15:08    CT reviewed confirming nondisplaced fracture of the distal radius.  No articular involvement.  Scaphoid is intact.        1. Closed fracture of distal end of right radius with routine healing, unspecified fracture morphology, subsequent encounter            Plan:             Imaging and exam findings discussed with the patient.  CT did confirm small nondisplaced fracture of the distal radius.  We will continue nonoperative management.  Continue wrist brace.  I will see her back in 3-4 weeks with repeat radiographs and motion examination for fracture care.  Continue off work.  Likely release at the follow up visit.  All of her questions were answered and she was in agreement.

## 2023-03-06 NOTE — LETTER
March 6, 2023    Alysha Capps  [address] [place] [place] [address]          [facility]  [address] [place] [address], [address]  [facility] [address]  Phone: [phone]  Fax: [phone] March 6, 2023     Patient: Alysha Capps   YOB: 1995   Date of Visit: 3/6/2023       To Whom It May Concern:    It is my medical opinion that Alysha Capps may not return to work until her follow up appointment on 03/27/23    If you have any questions or concerns, please don't hesitate to call.    Sincerely,        Michael Gray MD

## 2023-03-27 ENCOUNTER — HOSPITAL ENCOUNTER (OUTPATIENT)
Dept: RADIOLOGY | Facility: CLINIC | Age: 28
Discharge: HOME | End: 2023-03-27
Attending: REHABILITATION UNIT
Payer: COMMERCIAL

## 2023-03-27 ENCOUNTER — OFFICE VISIT (OUTPATIENT)
Dept: ORTHOPEDICS | Facility: CLINIC | Age: 28
End: 2023-03-27
Payer: COMMERCIAL

## 2023-03-27 VITALS — HEIGHT: 65 IN | WEIGHT: 165 LBS | BODY MASS INDEX: 27.49 KG/M2

## 2023-03-27 DIAGNOSIS — S52.501D CLOSED FRACTURE OF DISTAL END OF RIGHT RADIUS WITH ROUTINE HEALING, UNSPECIFIED FRACTURE MORPHOLOGY, SUBSEQUENT ENCOUNTER: ICD-10-CM

## 2023-03-27 DIAGNOSIS — S52.501D CLOSED FRACTURE OF DISTAL END OF RIGHT RADIUS WITH ROUTINE HEALING, UNSPECIFIED FRACTURE MORPHOLOGY, SUBSEQUENT ENCOUNTER: Primary | ICD-10-CM

## 2023-03-27 PROCEDURE — 99024 POSTOP FOLLOW-UP VISIT: CPT | Mod: ,,, | Performed by: REHABILITATION UNIT

## 2023-03-27 PROCEDURE — 1159F MED LIST DOCD IN RCRD: CPT | Mod: CPTII,,, | Performed by: REHABILITATION UNIT

## 2023-03-27 PROCEDURE — 73110 XR WRIST COMPLETE 3 VIEWS RIGHT: ICD-10-PCS | Mod: RT,,, | Performed by: REHABILITATION UNIT

## 2023-03-27 PROCEDURE — 3008F PR BODY MASS INDEX (BMI) DOCUMENTED: ICD-10-PCS | Mod: CPTII,,, | Performed by: REHABILITATION UNIT

## 2023-03-27 PROCEDURE — 3008F BODY MASS INDEX DOCD: CPT | Mod: CPTII,,, | Performed by: REHABILITATION UNIT

## 2023-03-27 PROCEDURE — 1159F PR MEDICATION LIST DOCUMENTED IN MEDICAL RECORD: ICD-10-PCS | Mod: CPTII,,, | Performed by: REHABILITATION UNIT

## 2023-03-27 PROCEDURE — 99024 PR POST-OP FOLLOW-UP VISIT: ICD-10-PCS | Mod: ,,, | Performed by: REHABILITATION UNIT

## 2023-03-27 PROCEDURE — 73110 X-RAY EXAM OF WRIST: CPT | Mod: RT,,, | Performed by: REHABILITATION UNIT

## 2023-03-27 NOTE — LETTER
[facility]  [address] [place] [address]. [address], [address]  Phone: [phone]  Fax: [phone]    Name:Alysha Capps  :1995   Date:2023       PATIENT IS ABLE TO RETURN TO WORK AS OF: 23    [_] SEDENTARY WORK: Lifting 10 pounds maximum and occasionally lifting and/or carrying articles such as dockers, ledgers and small tools.  Although a sedentary job is defined as one which involved sitting, a certain amount of walking and standing are required only occasionally and other sedentary criteria are met.    [_] LIGHT WORK: Lifting 20 pounds with frequent lifting and/or carrying objects weighing up to 10 pounds.  Even though the weight lifted may be only a negotiable amount, a job is in the category when it involves sitting most of the time with a degree of pushing/pulling of arm and/or leg controls.    [_] MEDIUM WORK: Lifting of 50 pounds maximum with frequent lifting and/or carrying of objects up to 25 pounds.    [_] HEAVY WORK: Lifting of 100 pounds maximum with frequent lifting and/or carrying objects up to 50 pounds.    [_] VERY HEAVY WORK: Lifting objects in excess of 100 pounds with frequent lifting and/or carrying of objects weighing 50 pounds or more.    [XX_] REGULAR DUTY: [XX_] No Restrictions. [_] With Restrictions (See comments below0:    COMMENTS

## 2023-03-27 NOTE — PROGRESS NOTES
"Subjective:      Patient ID: Alysha Capps is a 28 y.o. female.    Chief Complaint: Pain of the Right Wrist (Follow up for RT wrist pain. No complaints of pain or swelling. ROM is improved but not 100%. Not taking any medicine. )    HPI:   Alysha Capps is a 28 y.o. female who presents today for follow-up evaluation of her right wrist.  She continues to improve.  No significant.  Motion is also improved, but she feels lacking terminal extent.  Out of brace.  No swelling. No sensory or motor changes distally.  She has been off work.    Initial HPI:  She is right-hand dominant.  She sustained a fall on 2/4/23 while skating.  She went to an urgent care clinic where radiographs were concerning for fracture.  She is placed into a splint and referred for orthopedic care.  She works as a labor and delivery nurse.  No sensory or motor changes distally.  She has swelling and ecchymosis about the wrist.  Tenderness at the distal radius.  No tenderness or pain elsewhere.  No prior injuries.    History reviewed. No pertinent past medical history.  History reviewed. No pertinent surgical history.  Social History     Socioeconomic History    Marital status: Single   Tobacco Use    Smoking status: Never    Smokeless tobacco: Never   Substance and Sexual Activity    Alcohol use: Yes    Drug use: Not Currently    Sexual activity: Yes         Current Outpatient Medications:     dextroamphetamine-amphetamine 30 mg Tab, Adderall Take No date recorded No form recorded No frequency recorded No route recorded No set duration recorded No set duration amount recorded active No dosage strength recorded No dosage strength units of measure recorded, Disp: , Rfl:     CHAPARRITA 24 FE 1 mg-20 mcg (24)/75 mg (4) per tablet, Take 1 tablet by mouth., Disp: , Rfl:   Review of patient's allergies indicates:  No Known Allergies    Ht 5' 5" (1.651 m)   Wt 74.8 kg (165 lb)   BMI 27.46 kg/m²     Comprehensive review of systems completed and negative " except as per HPI.        Objective:   Head: Normocephalic, without obvious abnormality, atraumatic  Eyes: conjunctivae/corneas clear. EOM's intact  Ears: normal external appearance  Nose: Nares normal. Septum midline. Mucosa normal. No drainage  Throat: normal findings: lips normal without lesions  Lungs: unlabored breathing on room air  Chest wall: symmetric chest rise  Heart: regular rate and rhythm  Pulses: 2+ and symmetric  Skin: Skin color, texture, turgor normal. No rashes or lesions  Neurologic: Grossly normal    RUE    Appearance:   No significant swelling    Tenderness:   None at the Distal radius; none within the snuffbox or distal ulna    AROM:   Motion is nearly full and symmetric    Pulses: Palpable radial pulse    Neurological deficits: None    The patient has a warm and well-perfused upper extremity with capillary refill less than 2 seconds. Sensation is intact to light touch in terminal nerve distributions. 4/5 ain/pin/uln. The patient has no palpable epitrochlear lymphadenopathy.    Assessment:     Imaging:   Narrative & Impression  EXAMINATION:  XR WRIST COMPLETE 3 VIEWS RIGHT     CLINICAL HISTORY:  Fell yesterday while skateboarding;  Pain in right wrist     COMPARISON:  None     FINDINGS:  Three views of the right wrist demonstrate no fracture or dislocation.     Impression:     No acute findings.        Electronically signed by: Isaias Crum  Date:                                            02/04/2023  Time:                                           10:28           Exam Ended: 02/04/23 10:17 Last Resulted: 02/04/23 10:28             X-Ray Wrist Complete Right  See Provider Notes for results.     IMPRESSION: Please see provider office/clinic notes for radiology   interpretation    This procedure was auto-finalized by: Virtual Radiologist    CT reviewed confirming nondisplaced fracture of the distal radius.  No articular involvement.  Scaphoid is intact.    Three-view radiographs of the right  wrist obtained today personally reviewed showing stable appearance with interval healing.  Joint space intact.      1. Closed fracture of distal end of right radius with routine healing, unspecified fracture morphology, subsequent encounter          Plan:       Orders Placed This Encounter    X-Ray Wrist Complete Right        Imaging and exam findings discussed with the patient.  No tenderness at the fracture site with good motion on examination.  Radiographs are stable.  She is nearly 8 weeks out from injury.  She is cleared to return to full duty starting next week.  I have advised her to continue working on terminal extent of motion in the interim.  She will follow up with me as needed.  All of her questions were answered and she was in agreement.

## 2023-04-06 ENCOUNTER — OFFICE VISIT (OUTPATIENT)
Dept: URGENT CARE | Facility: CLINIC | Age: 28
End: 2023-04-06
Payer: COMMERCIAL

## 2023-04-06 VITALS
TEMPERATURE: 98 F | BODY MASS INDEX: 27.49 KG/M2 | HEART RATE: 113 BPM | DIASTOLIC BLOOD PRESSURE: 81 MMHG | OXYGEN SATURATION: 99 % | RESPIRATION RATE: 18 BRPM | HEIGHT: 65 IN | WEIGHT: 165 LBS | SYSTOLIC BLOOD PRESSURE: 119 MMHG

## 2023-04-06 DIAGNOSIS — M54.42 CHRONIC LEFT-SIDED LOW BACK PAIN WITH LEFT-SIDED SCIATICA: Primary | ICD-10-CM

## 2023-04-06 DIAGNOSIS — G89.29 CHRONIC LEFT-SIDED LOW BACK PAIN WITH LEFT-SIDED SCIATICA: Primary | ICD-10-CM

## 2023-04-06 PROCEDURE — 99213 PR OFFICE/OUTPT VISIT, EST, LEVL III, 20-29 MIN: ICD-10-PCS | Mod: ,,, | Performed by: FAMILY MEDICINE

## 2023-04-06 PROCEDURE — 99213 OFFICE O/P EST LOW 20 MIN: CPT | Mod: ,,, | Performed by: FAMILY MEDICINE

## 2023-04-06 RX ORDER — KETOROLAC TROMETHAMINE 10 MG/1
10 TABLET, FILM COATED ORAL EVERY 6 HOURS
Qty: 20 TABLET | Refills: 0 | Status: SHIPPED | OUTPATIENT
Start: 2023-04-06 | End: 2023-04-11

## 2023-04-06 NOTE — PATIENT INSTRUCTIONS
Considering patient's worsening back pain in last 4 days x-rays today.  X-ray lumbar spine, no concerns of acute finding.   Concerns of increased stool overload ascending and descending colon.  Radiology final results will be monitored on reported.   Persistent symptoms may need MRI.  Patient has appointment with neurologist on May 1st or will follow up with primary MD  Activities and stretching as tolerated.  Toradol prescribed as anti inflammation.  Avoid other NSAIDs while taking Toradol.  Tylenol for breakthrough pain.  ER precautions with any acute change in symptoms.

## 2023-04-06 NOTE — PROGRESS NOTES
"Subjective:      Patient ID: Alysha Capps is a 28 y.o. female.    Vitals:  height is 5' 5" (1.651 m) and weight is 74.8 kg (165 lb). Her temperature is 97.5 °F (36.4 °C). Her blood pressure is 119/81 and her pulse is 113 (abnormal). Her respiration is 18 and oxygen saturation is 99%.     Chief Complaint: Pain (Left lower back pain radiating down left leg x Sunday - no known injury )    HPI:  28-year-old female present to clinic with concerns of left low back pain radiating to left leg since 4 days.  No fall no trauma.  Similar complaints in the past since age 18.  States had received cortisone injection locally.  Has appointment with neurologist on May 1st.  Appears concerned with worsening pain.  No change in bowel movements or bladder habits.  No fever.      ROS :  Constitutional : No fever, no fatigue  Neck : Negative except HPI  Respiratory : No shortness of breath, no wheezing  Cardiovascular : No chest pain  Musculoskeletal : Negative except HPI  Integumentary : No rash, no abnormal lesion  Neurological : Negative for tingling numbness and weakness   Objective:     Physical Exam  General : Alert and oriented, No apparent distress, Afebrile, patient appears shaky and nervous states mainly because of pain  Neck -: supple, Non Tender  Respiratory :Lungs are clear to auscultate, Breath sounds are equal  Cardiovascular : Normal rate, normal volume pulse bilateral  Musculoskeletal :  Spine no point tenderness.  Left low back, SI joint mild pressure to palpate.  SLR 90° bilateral.  Integumentary : Warm, Dry   Neurologic : Alert and Oriented X 4, sensations intact, motor intact   Assessment:     1. Chronic left-sided low back pain with left-sided sciatica        Plan:   Considering patient's worsening back pain in last 4 days x-rays today.  X-ray lumbar spine, no concerns of acute finding.   Concerns of increased stool overload ascending and descending colon.  Radiology final results will be monitored on reported. "   Persistent symptoms may need MRI.  Patient has appointment with neurologist on May 1st or will follow up with primary MD  Activities and stretching as tolerated.  Toradol prescribed as anti inflammation.  Avoid other NSAIDs while taking Toradol.  Tylenol for breakthrough pain.  ER precautions with any acute change in symptoms.    Chronic left-sided low back pain with left-sided sciatica  -     X-Ray Lumbar Spine 2 Or 3 Views; Future; Expected date: 04/06/2023  -     ketorolac (TORADOL) 10 mg tablet; Take 1 tablet (10 mg total) by mouth every 6 (six) hours. for 5 days  Dispense: 20 tablet; Refill: 0

## 2023-05-12 DIAGNOSIS — M54.16 LUMBAR RADICULOPATHY: Primary | ICD-10-CM

## 2023-05-16 ENCOUNTER — OFFICE VISIT (OUTPATIENT)
Dept: PAIN MEDICINE | Facility: CLINIC | Age: 28
End: 2023-05-16
Payer: COMMERCIAL

## 2023-05-16 VITALS
SYSTOLIC BLOOD PRESSURE: 128 MMHG | WEIGHT: 165 LBS | HEIGHT: 65 IN | DIASTOLIC BLOOD PRESSURE: 82 MMHG | TEMPERATURE: 99 F | BODY MASS INDEX: 27.49 KG/M2

## 2023-05-16 DIAGNOSIS — M54.16 LUMBAR RADICULOPATHY: ICD-10-CM

## 2023-05-16 PROCEDURE — 1159F PR MEDICATION LIST DOCUMENTED IN MEDICAL RECORD: ICD-10-PCS | Mod: CPTII,,, | Performed by: NURSE PRACTITIONER

## 2023-05-16 PROCEDURE — 3074F SYST BP LT 130 MM HG: CPT | Mod: CPTII,,, | Performed by: NURSE PRACTITIONER

## 2023-05-16 PROCEDURE — 3079F DIAST BP 80-89 MM HG: CPT | Mod: CPTII,,, | Performed by: NURSE PRACTITIONER

## 2023-05-16 PROCEDURE — 3008F BODY MASS INDEX DOCD: CPT | Mod: CPTII,,, | Performed by: NURSE PRACTITIONER

## 2023-05-16 PROCEDURE — 99203 OFFICE O/P NEW LOW 30 MIN: CPT | Mod: ,,, | Performed by: NURSE PRACTITIONER

## 2023-05-16 PROCEDURE — 3074F PR MOST RECENT SYSTOLIC BLOOD PRESSURE < 130 MM HG: ICD-10-PCS | Mod: CPTII,,, | Performed by: NURSE PRACTITIONER

## 2023-05-16 PROCEDURE — 3079F PR MOST RECENT DIASTOLIC BLOOD PRESSURE 80-89 MM HG: ICD-10-PCS | Mod: CPTII,,, | Performed by: NURSE PRACTITIONER

## 2023-05-16 PROCEDURE — 1160F PR REVIEW ALL MEDS BY PRESCRIBER/CLIN PHARMACIST DOCUMENTED: ICD-10-PCS | Mod: CPTII,,, | Performed by: NURSE PRACTITIONER

## 2023-05-16 PROCEDURE — 1160F RVW MEDS BY RX/DR IN RCRD: CPT | Mod: CPTII,,, | Performed by: NURSE PRACTITIONER

## 2023-05-16 PROCEDURE — 1159F MED LIST DOCD IN RCRD: CPT | Mod: CPTII,,, | Performed by: NURSE PRACTITIONER

## 2023-05-16 PROCEDURE — 99203 PR OFFICE/OUTPT VISIT, NEW, LEVL III, 30-44 MIN: ICD-10-PCS | Mod: ,,, | Performed by: NURSE PRACTITIONER

## 2023-05-16 PROCEDURE — 3008F PR BODY MASS INDEX (BMI) DOCUMENTED: ICD-10-PCS | Mod: CPTII,,, | Performed by: NURSE PRACTITIONER

## 2023-05-16 RX ORDER — GABAPENTIN 300 MG/1
300 CAPSULE ORAL 3 TIMES DAILY
COMMUNITY
Start: 2023-04-19

## 2023-05-16 RX ORDER — NORETHINDRONE ACETATE AND ETHINYL ESTRADIOL, AND FERROUS FUMARATE 1MG-20(24)
1 KIT ORAL
COMMUNITY
Start: 2023-05-08

## 2023-05-16 RX ORDER — DEXTROAMPHETAMINE SACCHARATE, AMPHETAMINE ASPARTATE, DEXTROAMPHETAMINE SULFATE AND AMPHETAMINE SULFATE 5; 5; 5; 5 MG/1; MG/1; MG/1; MG/1
1 TABLET ORAL 2 TIMES DAILY
COMMUNITY
Start: 2023-04-17

## 2023-05-16 RX ORDER — MELOXICAM 15 MG/1
15 TABLET ORAL DAILY
COMMUNITY
Start: 2023-04-19

## 2023-05-16 NOTE — PROGRESS NOTES
Subjective:      Patient ID: Alysha Capps is a 28 y.o. female.    Chief Complaint: Back Pain (Patient referred by Dr. Bingham for back pain.  Rates pain today at 3/10 but can get as high as 8/10. Rates down left leg.)    Referred by: Robin Bingham MD     HPI:  Patient presents as a new consult for lumbar radiculopathy .  Patient has  left leg pain behind left knee and down to left calf.. Rare left buttock pain after she completed a left SI joint manipulation per physical therapist.  Since this manipulation, she no longer has left SI joint pain.  Pain started March 24th after heavy lifting.  She tried conservative treatments which include stretching and over-the-counter nonsteroidal anti-inflammatory meds   She is also tried muscle relaxer.  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  Pain is exacerbated with standing and walking.  Symptoms fluctuate.  Sitting and sedentary activity reduce the pain.  She has an appointment with physical therapy starting this week for her left leg pain.  Her current pain score is 8/10.  This will elevate to 10/10 with prolonged walking and standing.  Pain does not wake her up at night.  Sitting and lying down her pain is reduced significantly she was unable to give a number.  Negative spinal surgery.  She has noted disc bulges to her lumbar spine since she was 11 years old.  Reports this is genetic.  She also received prior lumbar epidural steroids when she was 11 in 17 years old.  She is not received an epidural steroid since then.  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  In the past she has also tried muscle relaxers.  Currently she takes gabapentin 1-3 times a day as needed.    Pain worse standing + walking. Does   No spinal surgery right    Interventional Pain History  11 & 17 years old had GUSI    ROS:  Left leg pain    MRI lumbar spine:2023  See results in original consult from [facility] for details.    L5-S1: Bulge protrusion is associated with Schmorl's node along  the posterior aspect of the S1 superior endplate flattening ventral thecal sac with AP dimension measuring about 7 mm in the midline.  Annular tears are seen posteriorly and along the foraminal region.  Facet and ligamentous hypertrophy cause narrowing from the posterior lateral aspect.  There is impingement of both descending S1 nerve roots between disc and posterior elements.  Disc and underlying spondylosis extend into each foraminal region, abutting exiting L5 nerve roots.  Findings result in moderate canal and bilateral foraminal stenosis, slightly greater on the left.        Objective:          Physical Exam  General: Well developed; overweight; A&O x 3; No anxiety/depression; NAD  Mental Status: Oriented to person, palce and time. Displays appropriate mood & affect.  Head: Norm cephalic and atraumatic  Neck: Midline trachea  Eyes: normal conjunctiva, normal lids, normal pupils  ENT and mouth: normal external ear, nose, and no lesions noted on the lips.  Respiratory: Symmetrical, Unlabored. No dyspnea  CV: normal  S1/S2, normal rhythm and rate. No peripheral edema.   Abdomen: Non-distended    Extremities:  Gen: No cyanosis or tenderness to palpation bilateral upper and lower extremities  Skin: Warm, pink, dry, no rashes, no lesions on the lumbar spine  Strength: 5/5 motor strength bilateral upper and lower  ROM: Full ROM in bilateral knees and ankles without pain or instability.    Neuro:  Gait: antalgic . Independent ambulator .no altalgic lean, normal toe and heel raise  DTR's: 3+ in bilateral patellar  Sensory: Intact to light touch bilateral  upper and lower extremities    Spine: Normal lordosis. No scoliosis  L-spine ROM: Full ROM to flexion, extension, bilateral rotation,   Straight Leg Raise: + bilaterally  SI Joint: No tenderness to palpation bilaterally.             Assessment:   With exam patient has positive sciatica both legs left greater than right.  Asymptomatic on right leg.  MRI lumbar spine  notes L5-S1 bulge protrusion and also spondylosis abutting the exiting L5 nerve roots    Request sent for LEFT L5 +S1    Hold Mobic and NSAIDs 1 week prior to   Recommended patient take gabapentin scheduled 300 mg 3 times a day if this does not cause sedation.  She could also take it twice a day if that lower dose is more manageable at work.  PT is scheduled. She will start this week  Follow up post op  Encounter Diagnosis   Name Primary?    Lumbar radiculopathy          Plan:       Alysha was seen today for back pain.    Diagnoses and all orders for this visit:    Lumbar radiculopathy  -     Ambulatory referral/consult to Pain Clinic             History reviewed. No pertinent past medical history.    Past Surgical History:   Procedure Laterality Date    WISDOM TOOTH EXTRACTION         History reviewed. No pertinent family history.    Social History     Socioeconomic History    Marital status: Single   Tobacco Use    Smoking status: Never   Substance and Sexual Activity    Alcohol use: Yes     Alcohol/week: 2.0 standard drinks     Types: 2 Glasses of wine per week    Drug use: Never    Sexual activity: Yes       Current Outpatient Medications   Medication Sig Dispense Refill    dextroamphetamine-amphetamine (ADDERALL) 20 mg tablet SMARTSI Tablet(s) By Mouth Morning-Night      gabapentin (NEURONTIN) 300 MG capsule Take 300 mg by mouth 3 (three) times daily.      CHAPARRITA 24 FE 1 mg-20 mcg (24)/75 mg (4) per tablet Take 1 tablet by mouth.      meloxicam (MOBIC) 15 MG tablet Take 15 mg by mouth.       No current facility-administered medications for this visit.       Review of patient's allergies indicates:  No Known Allergies

## 2023-06-28 ENCOUNTER — ANESTHESIA EVENT (OUTPATIENT)
Dept: SURGERY | Facility: HOSPITAL | Age: 28
End: 2023-06-28
Payer: COMMERCIAL

## 2023-07-03 RX ORDER — SODIUM CHLORIDE, SODIUM GLUCONATE, SODIUM ACETATE, POTASSIUM CHLORIDE AND MAGNESIUM CHLORIDE 30; 37; 368; 526; 502 MG/100ML; MG/100ML; MG/100ML; MG/100ML; MG/100ML
INJECTION, SOLUTION INTRAVENOUS CONTINUOUS
Status: CANCELLED | OUTPATIENT
Start: 2023-07-03 | End: 2023-08-02

## 2023-07-03 RX ORDER — SODIUM CHLORIDE, SODIUM LACTATE, POTASSIUM CHLORIDE, CALCIUM CHLORIDE 600; 310; 30; 20 MG/100ML; MG/100ML; MG/100ML; MG/100ML
INJECTION, SOLUTION INTRAVENOUS CONTINUOUS
Status: CANCELLED | OUTPATIENT
Start: 2023-07-03

## 2023-07-03 NOTE — ANESTHESIA PREPROCEDURE EVALUATION
07/03/2023  Alysha Capps is a 28 y.o., female with ----------------------------  ADHD (attention deficit hyperactivity disorder)  Lumbar herniated disc    And ----------------------------  Left arm  Rt finger  Sandoval tooth extraction    Presents for LESI.      Pre-op Assessment    I have reviewed the NPO Status.      Review of Systems      Physical Exam  General: Well nourished, Cooperative, Alert and Oriented    Airway:  Mallampati: II   Mouth Opening: Normal  TM Distance: Normal  Tongue: Normal  Neck ROM: Normal ROM    Dental:  Intact    Chest/Lungs:  Clear to auscultation, Normal Respiratory Rate    Heart:  Rate: Normal  Rhythm: Regular Rhythm        Anesthesia Plan  Type of Anesthesia, risks & benefits discussed:    Anesthesia Type: Gen Natural Airway  Intra-op Monitoring Plan: Standard ASA Monitors  Post Op Pain Control Plan: IV/PO Opioids PRN  Induction:  IV  Airway Plan: Direct  Informed Consent: Informed consent signed with the Patient and all parties understand the risks and agree with anesthesia plan.  All questions answered. Patient consented to blood products? No  ASA Score: 2  Day of Surgery Review of History & Physical: H&P Update referred to the surgeon/provider.  Anesthesia Plan Notes: Nasal cannula vs facemask supplemental oxygenation   For patients with CHEMO/obesity, may consider SuperNoval Nasal CPAP      Ready For Surgery From Anesthesia Perspective.     .

## 2023-07-05 ENCOUNTER — ANESTHESIA (OUTPATIENT)
Dept: SURGERY | Facility: HOSPITAL | Age: 28
End: 2023-07-05
Payer: COMMERCIAL

## 2023-07-05 ENCOUNTER — HOSPITAL ENCOUNTER (OUTPATIENT)
Facility: HOSPITAL | Age: 28
Discharge: HOME | End: 2023-07-05
Attending: ANESTHESIOLOGY | Admitting: ANESTHESIOLOGY
Payer: COMMERCIAL

## 2023-07-05 VITALS
OXYGEN SATURATION: 99 % | HEART RATE: 87 BPM | TEMPERATURE: 98 F | SYSTOLIC BLOOD PRESSURE: 125 MMHG | WEIGHT: 174.81 LBS | RESPIRATION RATE: 18 BRPM | BODY MASS INDEX: 29.12 KG/M2 | DIASTOLIC BLOOD PRESSURE: 85 MMHG | HEIGHT: 65 IN

## 2023-07-05 DIAGNOSIS — G89.29 CHRONIC BACK PAIN GREATER THAN 3 MONTHS DURATION: ICD-10-CM

## 2023-07-05 DIAGNOSIS — M54.9 CHRONIC BACK PAIN GREATER THAN 3 MONTHS DURATION: ICD-10-CM

## 2023-07-05 PROCEDURE — 25000003 PHARM REV CODE 250: Performed by: NURSE ANESTHETIST, CERTIFIED REGISTERED

## 2023-07-05 PROCEDURE — 64484 NJX AA&/STRD TFRM EPI L/S EA: CPT | Performed by: ANESTHESIOLOGY

## 2023-07-05 PROCEDURE — 63600175 PHARM REV CODE 636 W HCPCS: Performed by: ANESTHESIOLOGY

## 2023-07-05 PROCEDURE — 64483 NJX AA&/STRD TFRM EPI L/S 1: CPT | Mod: LT,,, | Performed by: ANESTHESIOLOGY

## 2023-07-05 PROCEDURE — 37000008 HC ANESTHESIA 1ST 15 MINUTES: Performed by: ANESTHESIOLOGY

## 2023-07-05 PROCEDURE — D9220A PRA ANESTHESIA: ICD-10-PCS | Mod: 23,,, | Performed by: NURSE ANESTHETIST, CERTIFIED REGISTERED

## 2023-07-05 PROCEDURE — 64483 PR EPIDURAL INJ, ANES/STEROID, TRANSFORAMINAL, LUMB/SACR, SNGL LEVL: ICD-10-PCS | Mod: LT,,, | Performed by: ANESTHESIOLOGY

## 2023-07-05 PROCEDURE — 25000003 PHARM REV CODE 250: Performed by: ANESTHESIOLOGY

## 2023-07-05 PROCEDURE — 37000009 HC ANESTHESIA EA ADD 15 MINS: Performed by: ANESTHESIOLOGY

## 2023-07-05 PROCEDURE — D9220A PRA ANESTHESIA: Mod: 23,,, | Performed by: NURSE ANESTHETIST, CERTIFIED REGISTERED

## 2023-07-05 PROCEDURE — 64483 NJX AA&/STRD TFRM EPI L/S 1: CPT | Performed by: ANESTHESIOLOGY

## 2023-07-05 PROCEDURE — 63600175 PHARM REV CODE 636 W HCPCS: Performed by: NURSE ANESTHETIST, CERTIFIED REGISTERED

## 2023-07-05 RX ORDER — LIDOCAINE HYDROCHLORIDE 10 MG/ML
INJECTION, SOLUTION EPIDURAL; INFILTRATION; INTRACAUDAL; PERINEURAL
Status: DISCONTINUED | OUTPATIENT
Start: 2023-07-05 | End: 2023-07-05

## 2023-07-05 RX ORDER — LIDOCAINE HYDROCHLORIDE 10 MG/ML
INJECTION, SOLUTION EPIDURAL; INFILTRATION; INTRACAUDAL; PERINEURAL
Status: DISCONTINUED | OUTPATIENT
Start: 2023-07-05 | End: 2023-07-05 | Stop reason: HOSPADM

## 2023-07-05 RX ORDER — DEXAMETHASONE SODIUM PHOSPHATE 10 MG/ML
INJECTION INTRAMUSCULAR; INTRAVENOUS
Status: DISCONTINUED | OUTPATIENT
Start: 2023-07-05 | End: 2023-07-05 | Stop reason: HOSPADM

## 2023-07-05 RX ORDER — BUPIVACAINE HYDROCHLORIDE 2.5 MG/ML
INJECTION, SOLUTION EPIDURAL; INFILTRATION; INTRACAUDAL
Status: DISCONTINUED | OUTPATIENT
Start: 2023-07-05 | End: 2023-07-05 | Stop reason: HOSPADM

## 2023-07-05 RX ORDER — PROPOFOL 10 MG/ML
VIAL (ML) INTRAVENOUS
Status: DISCONTINUED | OUTPATIENT
Start: 2023-07-05 | End: 2023-07-05

## 2023-07-05 RX ADMIN — PROPOFOL 100 MG: 10 INJECTION, EMULSION INTRAVENOUS at 08:07

## 2023-07-05 RX ADMIN — PROPOFOL 50 MG: 10 INJECTION, EMULSION INTRAVENOUS at 08:07

## 2023-07-05 RX ADMIN — LIDOCAINE HYDROCHLORIDE 5 ML: 10 INJECTION, SOLUTION EPIDURAL; INFILTRATION; INTRACAUDAL; PERINEURAL at 08:07

## 2023-07-05 NOTE — OP NOTE
Procedure:    Left L5  transforaminal epidural steroid injection    Left S1  transforaminal epidural steroid injection    Pre-Procedure Diagnoses:  Chronic back pain greater than 3 months  Lumbar degenerative disc disease  Lumbar radiculopathy  Lumbar disc displacement    Post-Procedure Diagnoses:  Chronic back pain greater than 3 months  Lumbar degenerative disc disease  Lumbar radiculopathy  Lumbar disc displacement    Anesthesia:  Local and MAC    Estimated Blood Loss:  < 2 ML    Consent:  The procedure, risks, benefits, and alternatives were discussed with the patient.  The patient voiced understanding and fully informed written consent was obtained.    Description of the Procedure:  The patient was taken to the operating room and placed in the prone position. The skin overlying the lumbar spine was prepped with Chloraprep and draped in the usual sterile fashion.  An oblique fluoroscopic view was obtained on the left side at L5, with the superior articular process of the sacral ala aligned with the pedicle.  Skin anesthesia was achieved using 2 mL of lidocaine 1%.  A 22-gauge 3.5 -inch Quinke spinal needle was inserted and advanced under intermittent fluoroscopic views into the epidural space. Proper needle position was confirmed under AP, oblique, and lateral fluoroscopic views.  Negative aspiration for blood or CSF was confirmed. 1 mL of contrast was injected, which revealed spread into the epidural space.  Then a combination of 5 mg of dexamethasone with 1 mL of 0.25% bupivacaine was easily injected.   There was no pain on injection. The needle was removed intact and bleeding was nil.  The same procedure was repeated in identical fashion on the left side at S1 .  Sterile bandages were applied. The patient was taken to the recovery room for further observation in stable condition. The patient was then discharged home with no complications.

## 2023-07-05 NOTE — DISCHARGE SUMMARY
[facility] Per[facility] Services  Discharge Note  Short Stay    Procedure(s) (LRB):  Injection,steroid,epidural,transforaminal approach (Left)      OUTCOME: Patient tolerated treatment/procedure well without complication and is now ready for discharge.    DISPOSITION: Home or Self Care    FINAL DIAGNOSIS:  <principal problem not specified>    FOLLOWUP: In clinic    DISCHARGE INSTRUCTIONS:  No discharge procedures on file.      Clinical Reference Documents Added to Patient Instructions         Document                TIME SPENT ON DISCHARGE: 5 minutes

## 2023-07-05 NOTE — TRANSFER OF CARE
"Anesthesia Transfer of Care Note    Patient: Alysha Capps    Procedure(s) Performed: Procedure(s) (LRB):  Injection,steroid,epidural,transforaminal approach (Left)    Patient location: OPS    Anesthesia Type: MAC    Transport from OR: Transported from OR on room air with adequate spontaneous ventilation    Post pain: adequate analgesia    Post assessment: no apparent anesthetic complications    Post vital signs: stable    Level of consciousness: awake, alert and oriented    Nausea/Vomiting: no nausea/vomiting    Complications: none    Transfer of care protocol was followed      Last vitals:   Visit Vitals  /79 (BP Location: Left arm, Patient Position: Lying)   Pulse 107   Temp 36.5 °C (97.7 °F) (Skin)   Resp (!) 21   Ht 5' 5" (1.651 m)   Wt 79.3 kg (174 lb 13.2 oz)   LMP 06/14/2023   SpO2 100%   BMI 29.09 kg/m²     "

## 2023-07-05 NOTE — DISCHARGE INSTRUCTIONS
MEDIAL BRANCH BLOCK/ EPIDURAL STEROID INJECTION, CARE AFTER    These instructions give you information on caring for yourself after your procedure. Your doctor may also give you specific instructions. Call your doctor if you have any problems or questions after your procedure.     HOME CARE  Do not drive or use any machinery for the next 24 hours.  Do not do any hard physical activity for the next 24 hours  No heavy lifting for one week  Apply ice pack for comfort  Do not use a heating pad in area of injection  You may go back to eating as usual  Remove bandaids tomorrow and then you may shower  No tub soaking for 3-5 days    SIDE EFFECTS THAT COULD HAPPEN UP TO 4 HOURS AFTER THE INJECTION  If you have leg weakness or numbness, have someone help you walk. If the weakness or numbness does not go away, or if it gets worse go to the emergency department.  If you have dizziness, lie down right away. This usually helps. Sit up slowly and then when you have been sitting for a few minutes then stand up.  If you have a mild headache, drink fluids (especially drinks with caffeine) Call your doctor if the headache gets worse or persists.  When the numbing medicine wears off you may feel some discomfort where you had the shot. It usually only lasts for a few days. You may put ice over the injection site. Leave ice on for 20 minutes at a time and protect your skin during use.   You may feel minor back pain and stiffness at the site of the shot. Call your doctor if this pain gets worse or does not improve. You may feel nauseous or vomit several hours after your procedure. If this happens, try drinking small amounts of clear liquids until you feel better. If you continue to feel nauseated or continue vomiting, get help right away.    Steroids may take several days to start working. The shot usually helps leg pain more than back pain. The shot will not fix what is causing the pain but may take away some of the pain. This pain  relief may last from 2 weeks to 6 months.     GET HELP RIGHT AWAY IF:  You have very bad pain, headache or neck pain or stiffness  There is a change in your vision (double or blurry vision)  You have a fever over 101 or chills  You have swelling or redness at the injection site  You get weaker  You are not able to control your bladder or bowels  You are not able to urinate MEDIAL BRANCH BLOCK/ EPIDURAL STEROID INJECTION, CARE AFTER    These instructions give you information on caring for yourself after your procedure. Your doctor may also give you specific instructions. Call your doctor if you have any problems or questions after your procedure.     HOME CARE  Do not drive or use any machinery for the next 24 hours.  Do not do any hard physical activity for the next 24 hours  No heavy lifting for one week  Apply ice pack for comfort  Do not use a heating pad in area of injection  You may go back to eating as usual  Remove bandaids tomorrow and then you may shower  No tub soaking for 3-5 days    SIDE EFFECTS THAT COULD HAPPEN UP TO 4 HOURS AFTER THE INJECTION  If you have leg weakness or numbness, have someone help you walk. If the weakness or numbness does not go away, or if it gets worse go to the emergency department.  If you have dizziness, lie down right away. This usually helps. Sit up slowly and then when you have been sitting for a few minutes then stand up.  If you have a mild headache, drink fluids (especially drinks with caffeine) Call your doctor if the headache gets worse or persists.  When the numbing medicine wears off you may feel some discomfort where you had the shot. It usually only lasts for a few days. You may put ice over the injection site. Leave ice on for 20 minutes at a time and protect your skin during use.   You may feel minor back pain and stiffness at the site of the shot. Call your doctor if this pain gets worse or does not improve. You may feel nauseous or vomit several hours after  your procedure. If this happens, try drinking small amounts of clear liquids until you feel better. If you continue to feel nauseated or continue vomiting, get help right away.    Steroids may take several days to start working. The shot usually helps leg pain more than back pain. The shot will not fix what is causing the pain but may take away some of the pain. This pain relief may last from 2 weeks to 6 months.     GET HELP RIGHT AWAY IF:  You have very bad pain, headache or neck pain or stiffness  There is a change in your vision (double or blurry vision)  You have a fever over 101 or chills  You have swelling or redness at the injection site  You get weaker  You are not able to control your bladder or bowels  You are not able to urinate

## 2023-07-05 NOTE — H&P
Subjective:         Subjective    Patient ID: Alysha Capps is a 28 y.o. female.     Chief Complaint: Back Pain (Patient referred by Dr. Bingham for back pain.  Rates pain today at 3/10 but can get as high as 8/10. Rates down left leg.)     Referred by: Robin Bingham MD      HPI:  Patient presents as a new consult for lumbar radiculopathy .  Patient has  left leg pain behind left knee and down to left calf.. Rare left buttock pain after she completed a left SI joint manipulation per physical therapist.  Since this manipulation, she no longer has left SI joint pain.  Pain started March 24th after heavy lifting.  She tried conservative treatments which include stretching and over-the-counter nonsteroidal anti-inflammatory meds   She is also tried muscle relaxer.  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  Pain is exacerbated with standing and walking.  Symptoms fluctuate.  Sitting and sedentary activity reduce the pain.  She has an appointment with physical therapy starting this week for her left leg pain.  Her current pain score is 8/10.  This will elevate to 10/10 with prolonged walking and standing.  Pain does not wake her up at night.  Sitting and lying down her pain is reduced significantly she was unable to give a number.  Negative spinal surgery.  She has noted disc bulges to her lumbar spine since she was 11 years old.  Reports this is genetic.  She also received prior lumbar epidural steroids when she was 11 in 17 years old.  She is not received an epidural steroid since then.  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  In the past she has also tried muscle relaxers.  Currently she takes gabapentin 1-3 times a day as needed.     Pain worse standing + walking. Does   No spinal surgery right     Interventional Pain History  11 & 17 years old had LESI     ROS:  Left leg pain     MRI lumbar spine:2023  See results in original consult from [facility] for details.    L5-S1: Bulge protrusion is associated with  Schmorl's node along the posterior aspect of the S1 superior endplate flattening ventral thecal sac with AP dimension measuring about 7 mm in the midline.  Annular tears are seen posteriorly and along the foraminal region.  Facet and ligamentous hypertrophy cause narrowing from the posterior lateral aspect.  There is impingement of both descending S1 nerve roots between disc and posterior elements.  Disc and underlying spondylosis extend into each foraminal region, abutting exiting L5 nerve roots.  Findings result in moderate canal and bilateral foraminal stenosis, slightly greater on the left.           Objective:      Objective            Physical Exam  General: Well developed; overweight; A&O x 3; No anxiety/depression; NAD  Mental Status: Oriented to person, palce and time. Displays appropriate mood & affect.  Head: Norm cephalic and atraumatic  Neck: Midline trachea  Eyes: normal conjunctiva, normal lids, normal pupils  ENT and mouth: normal external ear, nose, and no lesions noted on the lips.  Respiratory: Symmetrical, Unlabored. No dyspnea  CV: normal  S1/S2, normal rhythm and rate. No peripheral edema.   Abdomen: Non-distended     Extremities:  Gen: No cyanosis or tenderness to palpation bilateral upper and lower extremities  Skin: Warm, pink, dry, no rashes, no lesions on the lumbar spine  Strength: 5/5 motor strength bilateral upper and lower  ROM: Full ROM in bilateral knees and ankles without pain or instability.     Neuro:  Gait: antalgic . Independent ambulator .no altalgic lean, normal toe and heel raise  DTR's: 3+ in bilateral patellar  Sensory: Intact to light touch bilateral  upper and lower extremities     Spine: Normal lordosis. No scoliosis  L-spine ROM: Full ROM to flexion, extension, bilateral rotation,   Straight Leg Raise: + bilaterally  SI Joint: No tenderness to palpation bilaterally.                 Assessment:   With exam patient has positive sciatica both legs left greater than right.   Asymptomatic on right leg.  MRI lumbar spine notes L5-S1 bulge protrusion and also spondylosis abutting the exiting L5 nerve roots     Request sent for LEFT L5 +S1     Hold Mobic and NSAIDs 1 week prior to   Recommended patient take gabapentin scheduled 300 mg 3 times a day if this does not cause sedation.  She could also take it twice a day if that lower dose is more manageable at work.  PT is scheduled. She will start this week  Follow up post op       Encounter Diagnosis   Name Primary?    Lumbar radiculopathy            Plan:      Juliette Encarnacion was seen today for back pain.     Diagnoses and all orders for this visit:     Lumbar radiculopathy  -     Ambulatory referral/consult to Pain Clinic                  History reviewed. No pertinent past medical history.           Past Surgical History:   Procedure Laterality Date    WISDOM TOOTH EXTRACTION             History reviewed. No pertinent family history.     Social History            Socioeconomic History    Marital status: Single   Tobacco Use    Smoking status: Never   Substance and Sexual Activity    Alcohol use: Yes       Alcohol/week: 2.0 standard drinks       Types: 2 Glasses of wine per week    Drug use: Never    Sexual activity: Yes         Current Medications          Current Outpatient Medications   Medication Sig Dispense Refill    dextroamphetamine-amphetamine (ADDERALL) 20 mg tablet SMARTSI Tablet(s) By Mouth Morning-Night        gabapentin (NEURONTIN) 300 MG capsule Take 300 mg by mouth 3 (three) times daily.        CHAPARRITA 24 FE 1 mg-20 mcg (24)/75 mg (4) per tablet Take 1 tablet by mouth.        meloxicam (MOBIC) 15 MG tablet Take 15 mg by mouth.          No current facility-administered medications for this visit.            Review of patient's allergies indicates:  No Known Allergies

## 2023-07-05 NOTE — ANESTHESIA POSTPROCEDURE EVALUATION
Anesthesia Post Evaluation    Patient: Alysha Capps    Procedure(s) Performed: Procedure(s) (LRB):  Injection,steroid,epidural,transforaminal approach (Left)    Final Anesthesia Type: MAC      Patient location during evaluation: OPS  Patient participation: Yes- Able to Participate  Level of consciousness: awake and alert and oriented  Post-procedure vital signs: reviewed and stable  Pain management: adequate  Airway patency: patent  CHEMO mitigation strategies: Multimodal analgesia  PONV status at discharge: No PONV  Anesthetic complications: no      Cardiovascular status: hemodynamically stable  Respiratory status: spontaneous ventilation and room air  Hydration status: euvolemic  Follow-up not needed.          Vitals Value Taken Time   /73 07/05/23 0810   Temp 36.5 07/05/23 0810   Pulse 101 07/05/23 0810   Resp 18 07/05/23 0810   SpO2 100 07/05/23 0810         No case tracking events are documented in the log.      Pain/Ellyn Score: No data recorded

## 2023-11-11 ENCOUNTER — HOSPITAL ENCOUNTER (EMERGENCY)
Facility: HOSPITAL | Age: 28
Discharge: HOME | End: 2023-11-11
Attending: EMERGENCY MEDICINE
Payer: COMMERCIAL

## 2023-11-11 VITALS
HEART RATE: 108 BPM | RESPIRATION RATE: 22 BRPM | TEMPERATURE: 98 F | SYSTOLIC BLOOD PRESSURE: 150 MMHG | BODY MASS INDEX: 27.49 KG/M2 | DIASTOLIC BLOOD PRESSURE: 105 MMHG | HEIGHT: 65 IN | WEIGHT: 165 LBS | OXYGEN SATURATION: 99 %

## 2023-11-11 DIAGNOSIS — R00.2 PALPITATIONS: ICD-10-CM

## 2023-11-11 DIAGNOSIS — R00.0 TACHYCARDIA: Primary | ICD-10-CM

## 2023-11-11 LAB
ALBUMIN SERPL-MCNC: 4.1 G/DL (ref 3.5–5)
ALBUMIN/GLOB SERPL: 1.1 RATIO (ref 1.1–2)
ALP SERPL-CCNC: 66 UNIT/L (ref 40–150)
ALT SERPL-CCNC: 113 UNIT/L (ref 0–55)
APPEARANCE UR: CLEAR
AST SERPL-CCNC: 68 UNIT/L (ref 5–34)
B-HCG SERPL QL: NEGATIVE
BACTERIA #/AREA URNS AUTO: ABNORMAL /HPF
BASOPHILS # BLD AUTO: 0.15 X10(3)/MCL
BASOPHILS NFR BLD AUTO: 1.7 %
BILIRUB SERPL-MCNC: 0.3 MG/DL
BILIRUB UR QL STRIP.AUTO: NEGATIVE
BNP BLD-MCNC: <10 PG/ML
BUN SERPL-MCNC: 9.8 MG/DL (ref 7–18.7)
CALCIUM SERPL-MCNC: 9.4 MG/DL (ref 8.4–10.2)
CHLORIDE SERPL-SCNC: 102 MMOL/L (ref 98–107)
CO2 SERPL-SCNC: 25 MMOL/L (ref 22–29)
COLOR UR AUTO: ABNORMAL
CREAT SERPL-MCNC: 0.77 MG/DL (ref 0.55–1.02)
D DIMER PPP IA.FEU-MCNC: <0.27 UG/ML FEU (ref 0–0.5)
EOSINOPHIL # BLD AUTO: 0.27 X10(3)/MCL (ref 0–0.9)
EOSINOPHIL NFR BLD AUTO: 3 %
ERYTHROCYTE [DISTWIDTH] IN BLOOD BY AUTOMATED COUNT: 11.3 % (ref 11.5–17)
GFR SERPLBLD CREATININE-BSD FMLA CKD-EPI: >60 MLS/MIN/1.73/M2
GLOBULIN SER-MCNC: 3.6 GM/DL (ref 2.4–3.5)
GLUCOSE SERPL-MCNC: 111 MG/DL (ref 74–100)
GLUCOSE UR QL STRIP.AUTO: NORMAL
HCT VFR BLD AUTO: 41.9 % (ref 37–47)
HGB BLD-MCNC: 14.4 G/DL (ref 12–16)
IMM GRANULOCYTES # BLD AUTO: 0.03 X10(3)/MCL (ref 0–0.04)
IMM GRANULOCYTES NFR BLD AUTO: 0.3 %
KETONES UR QL STRIP.AUTO: NEGATIVE
LEUKOCYTE ESTERASE UR QL STRIP.AUTO: NEGATIVE
LYMPHOCYTES # BLD AUTO: 2.92 X10(3)/MCL (ref 0.6–4.6)
LYMPHOCYTES NFR BLD AUTO: 33 %
MAGNESIUM SERPL-MCNC: 1.9 MG/DL (ref 1.6–2.6)
MCH RBC QN AUTO: 32.9 PG (ref 27–31)
MCHC RBC AUTO-ENTMCNC: 34.4 G/DL (ref 33–36)
MCV RBC AUTO: 95.7 FL (ref 80–94)
MONOCYTES # BLD AUTO: 0.67 X10(3)/MCL (ref 0.1–1.3)
MONOCYTES NFR BLD AUTO: 7.6 %
MUCOUS THREADS URNS QL MICRO: ABNORMAL /LPF
NEUTROPHILS # BLD AUTO: 4.82 X10(3)/MCL (ref 2.1–9.2)
NEUTROPHILS NFR BLD AUTO: 54.4 %
NITRITE UR QL STRIP.AUTO: NEGATIVE
NRBC BLD AUTO-RTO: 0 %
PH UR STRIP.AUTO: 6 [PH]
PHOSPHATE SERPL-MCNC: 3.8 MG/DL (ref 2.3–4.7)
PLATELET # BLD AUTO: 305 X10(3)/MCL (ref 130–400)
PMV BLD AUTO: 10.4 FL (ref 7.4–10.4)
POTASSIUM SERPL-SCNC: 4.3 MMOL/L (ref 3.5–5.1)
PROT SERPL-MCNC: 7.7 GM/DL (ref 6.4–8.3)
PROT UR QL STRIP.AUTO: NEGATIVE
RBC # BLD AUTO: 4.38 X10(6)/MCL (ref 4.2–5.4)
RBC #/AREA URNS AUTO: ABNORMAL /HPF
RBC UR QL AUTO: NEGATIVE
SODIUM SERPL-SCNC: 137 MMOL/L (ref 136–145)
SP GR UR STRIP.AUTO: 1.02 (ref 1–1.03)
SQUAMOUS #/AREA URNS LPF: ABNORMAL /HPF
TROPONIN I SERPL-MCNC: <0.01 NG/ML (ref 0–0.04)
TSH SERPL-ACNC: 3.03 UIU/ML (ref 0.35–4.94)
UROBILINOGEN UR STRIP-ACNC: NORMAL
WBC # SPEC AUTO: 8.86 X10(3)/MCL (ref 4.5–11.5)
WBC #/AREA URNS AUTO: ABNORMAL /HPF

## 2023-11-11 PROCEDURE — 84443 ASSAY THYROID STIM HORMONE: CPT | Performed by: PHYSICIAN ASSISTANT

## 2023-11-11 PROCEDURE — 99285 EMERGENCY DEPT VISIT HI MDM: CPT | Mod: 25

## 2023-11-11 PROCEDURE — 83880 ASSAY OF NATRIURETIC PEPTIDE: CPT | Performed by: EMERGENCY MEDICINE

## 2023-11-11 PROCEDURE — 96374 THER/PROPH/DIAG INJ IV PUSH: CPT

## 2023-11-11 PROCEDURE — 80053 COMPREHEN METABOLIC PANEL: CPT | Performed by: PHYSICIAN ASSISTANT

## 2023-11-11 PROCEDURE — 81025 URINE PREGNANCY TEST: CPT | Performed by: PHYSICIAN ASSISTANT

## 2023-11-11 PROCEDURE — 84484 ASSAY OF TROPONIN QUANT: CPT | Performed by: PHYSICIAN ASSISTANT

## 2023-11-11 PROCEDURE — 85379 FIBRIN DEGRADATION QUANT: CPT | Performed by: EMERGENCY MEDICINE

## 2023-11-11 PROCEDURE — 63600175 PHARM REV CODE 636 W HCPCS: Performed by: EMERGENCY MEDICINE

## 2023-11-11 PROCEDURE — 93005 ELECTROCARDIOGRAM TRACING: CPT

## 2023-11-11 PROCEDURE — 84100 ASSAY OF PHOSPHORUS: CPT | Performed by: EMERGENCY MEDICINE

## 2023-11-11 PROCEDURE — 96361 HYDRATE IV INFUSION ADD-ON: CPT

## 2023-11-11 PROCEDURE — 93010 ELECTROCARDIOGRAM REPORT: CPT | Mod: ,,, | Performed by: INTERNAL MEDICINE

## 2023-11-11 PROCEDURE — 85025 COMPLETE CBC W/AUTO DIFF WBC: CPT | Performed by: PHYSICIAN ASSISTANT

## 2023-11-11 PROCEDURE — 81001 URINALYSIS AUTO W/SCOPE: CPT | Performed by: PHYSICIAN ASSISTANT

## 2023-11-11 PROCEDURE — 93010 EKG 12-LEAD: ICD-10-PCS | Mod: ,,, | Performed by: INTERNAL MEDICINE

## 2023-11-11 PROCEDURE — 83735 ASSAY OF MAGNESIUM: CPT | Performed by: EMERGENCY MEDICINE

## 2023-11-11 RX ORDER — METOPROLOL SUCCINATE 25 MG/1
25 TABLET, EXTENDED RELEASE ORAL DAILY
Qty: 30 TABLET | Refills: 11 | Status: SHIPPED | OUTPATIENT
Start: 2023-11-11 | End: 2024-11-10

## 2023-11-11 RX ORDER — SODIUM CHLORIDE, SODIUM LACTATE, POTASSIUM CHLORIDE, CALCIUM CHLORIDE 600; 310; 30; 20 MG/100ML; MG/100ML; MG/100ML; MG/100ML
1000 INJECTION, SOLUTION INTRAVENOUS
Status: COMPLETED | OUTPATIENT
Start: 2023-11-11 | End: 2023-11-11

## 2023-11-11 RX ORDER — LORAZEPAM 2 MG/ML
1 INJECTION INTRAMUSCULAR
Status: COMPLETED | OUTPATIENT
Start: 2023-11-11 | End: 2023-11-11

## 2023-11-11 RX ADMIN — LORAZEPAM 1 MG: 2 INJECTION INTRAMUSCULAR; INTRAVENOUS at 09:11

## 2023-11-11 RX ADMIN — SODIUM CHLORIDE, POTASSIUM CHLORIDE, SODIUM LACTATE AND CALCIUM CHLORIDE 1000 ML: 600; 310; 30; 20 INJECTION, SOLUTION INTRAVENOUS at 09:11

## 2023-11-12 NOTE — FIRST PROVIDER EVALUATION
"Medical screening examination initiated.  I have conducted a focused provider triage encounter, findings are as follows:    Chief Complaint   Patient presents with    Palpitations     Pt. Here from work L&D nurse here. She states that about an hr ago she began to feel rapid heart rate and light headed she states that this has happen before in the passed and she is normally able to vagal and slow her rate.       Brief history of present illness:  28 y.o. female presents to the ED with palpitations and lightheadedness onset tonight while at work. Denies headache, n/v, chest pain, SOB. Hx of PVCs however has been cleared by cardiology. Notes she typically can vagal during these episodes however she tried that and it did not help.     Vitals:    11/11/23 2033   BP: (!) 150/97   Pulse: (!) 120   Resp: 20   Temp: 97.5 °F (36.4 °C)   SpO2: 97%   Weight: 74.8 kg (165 lb)   Height: 5' 5" (1.651 m)       Pertinent physical exam:  Awake, alert, ambulatory, non-labored respirations, anxious    Brief workup plan:  labs, EKG, UA    Preliminary workup initiated; this workup will be continued and followed by the physician or advanced practice provider that is assigned to the patient when roomed.  "

## 2023-11-12 NOTE — ED PROVIDER NOTES
Encounter Date: 11/11/2023       History     Chief Complaint   Patient presents with    Palpitations     Pt. Here from work L&D nurse here. She states that about an hr ago she began to feel rapid heart rate and light headed she states that this has happen before in the passed and she is normally able to vagal and slow her rate.       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  Patient came to the emergency department checked in she said there was really no orthostatic component to her tachycardia.  She really has not had any other symptoms.  She takes Adderall 20 mg twice a day and recently started on prenatal vitamins she is not missed a.  And is unsure of her pregnancy status but if she is pregnant she would be very early she says.    On exam the patient is mildly tachycardic in the 1 teens to 120s she appears somewhat anxious but otherwise her physical exam is normal there was no leg swelling or calf tenderness.      Review of patient's allergies indicates:  No Known Allergies  Past Medical History:   Diagnosis Date    ADHD (attention deficit hyperactivity disorder)     Lumbar herniated disc      Past Surgical History:   Procedure Laterality Date    left arm      LUMBAR EPIDURAL INJECTION N/A     x2    rt finger      TRANSFORAMINAL EPIDURAL INJECTION OF STEROID Left 7/5/2023    Procedure: Injection,steroid,epidural,transforaminal approach;  Surgeon: Isamar Unger MD;  Location: [facility];  Service: Pain Management;  Laterality: Left;  Transforaminal Epidural Steroid Injection..left L5-S1    WISDOM TOOTH EXTRACTION       Family History   Problem Relation Age of Onset    No Known Problems Mother     No Known Problems Father     No Known Problems Sister     No  Known Problems Brother      Social History     Tobacco Use    Smoking status: Never    Smokeless tobacco: Never   Substance Use Topics    Alcohol use: Yes     Alcohol/week: 2.0 standard drinks of alcohol     Types: 2 Glasses of wine per week    Drug use: Never     Review of Systems   Cardiovascular:  Positive for palpitations.       Physical Exam     Initial Vitals [11/11/23 2033]   BP Pulse Resp Temp SpO2   (!) 150/97 (!) 120 20 97.5 °F (36.4 °C) 97 %      MAP       --         Physical Exam    HENT:   Head: Normocephalic.   Eyes: EOM are normal. Left eye exhibits no discharge. No scleral icterus.   Cardiovascular:            Tachycardic regular   Pulmonary/Chest: No stridor. No respiratory distress.   Abdominal: She exhibits no distension.   Musculoskeletal:         General: Normal range of motion.     Neurological: She is alert and oriented to person, place, and time. She has normal strength.   Skin: Skin is dry. No rash noted. No erythema. No pallor.   Psychiatric: She has a normal mood and affect. Her behavior is normal. Judgment and thought content normal.         ED Course   Procedures  Labs Reviewed   COMPREHENSIVE METABOLIC PANEL - Abnormal; Notable for the following components:       Result Value    Glucose Level 111 (*)     Globulin 3.6 (*)     Alanine Aminotransferase 113 (*)     Aspartate Aminotransferase 68 (*)     All other components within normal limits   URINALYSIS, REFLEX TO URINE CULTURE - Abnormal; Notable for the following components:    Mucous, UA Trace (*)     All other components within normal limits   CBC WITH DIFFERENTIAL - Abnormal; Notable for the following components:    MCV 95.7 (*)     MCH 32.9 (*)     RDW 11.3 (*)     All other components within normal limits   PREGNANCY TEST, URINE RAPID - Normal   TROPONIN I - Normal   TSH - Normal   MAGNESIUM - Normal   D DIMER, QUANTITATIVE - Normal   PHOSPHORUS - Normal   B-TYPE NATRIURETIC PEPTIDE - Normal   CBC W/ AUTO DIFFERENTIAL     Narrative:     The following orders were created for panel order CBC auto differential.  Procedure                               Abnormality         Status                     ---------                               -----------         ------                     CBC with Differential[[id number]]        Abnormal            Final result                 Please view results for these tests on the individual orders.     EKG Readings: (Independently Interpreted)   EKG rate of 116 sinus tachycardia no STEMI no ectopy intervals are normal time was 8:25 p.m.       Imaging Results              X-Ray Chest 1 View (Final result)  Result time 11/11/23 21:25:36      Final result by Red Dumont MD (11/11/23 21:25:36)                   Impression:      No acute cardiopulmonary abnormality.      Electronically signed by: Red Dumont MD  Date:    11/11/2023  Time:    21:25               Narrative:    EXAMINATION:  Single view chest radiograph.    CLINICAL HISTORY:  palpitations;    TECHNIQUE:  Single view of the chest.    COMPARISON:  None.    FINDINGS:  The lungs are clear without consolidation or effusion.  There is no pneumothorax.  The cardiac silhouette is normal in size.  There is no acute osseous abnormality.                                       Medications   lactated ringers infusion (0 mLs Intravenous Stopped 11/11/23 2222)   LORazepam injection 1 mg (1 mg Intravenous Given 11/11/23 2101)     Medical Decision Making  Amount and/or Complexity of Data Reviewed  Labs: ordered.  Radiology: ordered.    Risk  Prescription drug management.               ED Course as of 11/11/23 2239   Sat Nov 11, 2023 2221 Cis appt line called, called for a holter monitor [NL]      ED Course User Index  [NL] Ilia Paulino MD                    Clinical Impression:   Final diagnoses:  [R00.2] Palpitations  [R00.0] Tachycardia (Primary)        ED Disposition Condition    Discharge Stable          ED Prescriptions       Medication Sig  Dispense Start Date End Date Auth. Provider    metoprolol succinate (TOPROL-XL) 25 MG 24 hr tablet Take 1 tablet (25 mg total) by mouth once daily. 30 tablet 11/11/2023 11/10/2024 Ilia Paulino MD          Follow-up Information       Follow up With Specialties Details Why Contact Info    [facility]   [address] [place] [id number]      Chace Guzmán MD Cardiovascular Disease, Cardiology In 1 week  [address] [place] [address] [id number] [phone]               Ilia Paulino MD  11/11/23 5885

## 2023-11-13 LAB
OHS CV EVENT MONITOR DAY: 0
OHS CV HOLTER LENGTH DECIMAL HOURS: 24
OHS CV HOLTER LENGTH HOURS: 24
OHS CV HOLTER LENGTH MINUTES: 0
OHS CV HOLTER SINUS AVERAGE HR: 93
OHS CV HOLTER SINUS MAX HR: 127
OHS CV HOLTER SINUS MIN HR: 64

## (undated) DEVICE — NDL QUINCKE S/SU 22GA 5IN

## (undated) DEVICE — DRAPE MEDIUM SHEET 40X70IN

## (undated) DEVICE — SYR 3CC LUER LOC

## (undated) DEVICE — NDL FLTR 5MCRN BLNT TIP 18GX1

## (undated) DEVICE — SYR DISP LL 5CC

## (undated) DEVICE — GLOVE PROTEXIS PI CRM 6.5

## (undated) DEVICE — BANDAGE SHEER STRIP 3/4X3IN

## (undated) DEVICE — Device

## (undated) DEVICE — NDL SPINAL 22GA 3.5 IN QUINCKE

## (undated) DEVICE — KIT SURGICAL TURNOVER

## (undated) DEVICE — DRAPE UTILITY W/ TAPE 20X30IN

## (undated) DEVICE — SYR EPILOR LUER-LOK LOR 7ML

## (undated) DEVICE — NDL SAFETY 25G X 1.5 ECLIPSE

## (undated) DEVICE — APPLICATOR CHLORAPREP ORN 26ML

## (undated) DEVICE — POSITIONER HEAD ADULT

## (undated) DEVICE — SYR 10CC LUER LOCK

## (undated) DEVICE — SET SMARTSITE EXT SMALLBORE NF